# Patient Record
Sex: FEMALE | Race: WHITE | NOT HISPANIC OR LATINO | Employment: PART TIME | URBAN - METROPOLITAN AREA
[De-identification: names, ages, dates, MRNs, and addresses within clinical notes are randomized per-mention and may not be internally consistent; named-entity substitution may affect disease eponyms.]

---
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
## 2022-01-30 ENCOUNTER — OFFICE VISIT (OUTPATIENT)
Dept: URGENT CARE | Facility: CLINIC | Age: 22
End: 2022-01-30
Payer: MEDICARE

## 2022-01-30 VITALS
SYSTOLIC BLOOD PRESSURE: 134 MMHG | HEART RATE: 108 BPM | HEIGHT: 65 IN | RESPIRATION RATE: 16 BRPM | TEMPERATURE: 97.4 F | BODY MASS INDEX: 18.33 KG/M2 | WEIGHT: 110 LBS | DIASTOLIC BLOOD PRESSURE: 88 MMHG | OXYGEN SATURATION: 99 %

## 2022-01-30 DIAGNOSIS — K08.89 PAIN, DENTAL: Primary | ICD-10-CM

## 2022-01-30 DIAGNOSIS — K08.9 POOR DENTITION: ICD-10-CM

## 2022-01-30 PROBLEM — S39.012A STRAIN OF LUMBAR REGION: Status: ACTIVE | Noted: 2021-09-17

## 2022-01-30 PROCEDURE — 99213 OFFICE O/P EST LOW 20 MIN: CPT | Performed by: FAMILY MEDICINE

## 2022-01-30 RX ORDER — CLINDAMYCIN HYDROCHLORIDE 300 MG/1
300 CAPSULE ORAL EVERY 8 HOURS
Qty: 21 CAPSULE | Refills: 0 | Status: SHIPPED | OUTPATIENT
Start: 2022-01-30 | End: 2022-02-06

## 2022-01-30 NOTE — PATIENT INSTRUCTIONS
1  Dental pain  - allergy to Penicillin   - Clindamycin prescribed, to be completed as directed  - take Tylenol or Motrin as needed  - apply ice to the site  - may use topical Orajel solution as needed  - patient is to follow up with a dentist as soon as possible  - if symptoms persist despite treatment, worsen, or any new symptoms present, patient is to be seen in the ER

## 2022-01-30 NOTE — PROGRESS NOTES
[facility]        NAME: Terri Alvarez is a 25 y o  female  : 2000    MRN: [id number]  DATE: 2022  TIME: 3:32 PM    Assessment and Plan   Pain, dental [K08 89]  1  Pain, dental  clindamycin (CLEOCIN) 300 MG capsule   2  Poor dentition           Patient Instructions     Patient Instructions   1  Dental pain  - allergy to Penicillin   - Clindamycin prescribed, to be completed as directed  - take Tylenol or Motrin as needed  - apply ice to the site  - may use topical Orajel solution as needed  - patient is to follow up with a dentist as soon as possible  - if symptoms persist despite treatment, worsen, or any new symptoms present, patient is to be seen in the ER  Follow up with PCP in 3-5 days  Proceed to  ER if symptoms worsen  Chief Complaint     Chief Complaint   Patient presents with    Dental Pain     upper front x 1 week, taking tylenol         History of Present Illness       26 yo female presents for dental pain  Patient states her right front tooth is aching and tender to touch  She states the gums above the tooth look a little swollen  She denies any injury to tooth  No bleeding or pus like discharge from the tooth  No foul taste in the mouth  No fever/chills  No facial pain or swelling  Patient denies any chance of pregnancy  She has a known allergy to Penicillins  She has taken Clindamycin in the past for dental issues  She states she has been taking Tylenol and Nyquil as needed for the symptoms  She states she is currently in the process of establishing care with a dental office that accepts her insurance  Review of Systems   Review of Systems   Constitutional: Negative  HENT: Positive for dental problem  As noted in HPI   Eyes: Negative  Respiratory: Negative  Cardiovascular: Negative  Allergic/Immunologic:        Penicillin   Neurological: Negative  Hematological: Negative            Current Medications       Current Outpatient Medications:     clindamycin (CLEOCIN) 300 MG capsule, Take 1 capsule (300 mg total) by mouth every 8 (eight) hours for 7 days, Disp: 21 capsule, Rfl: 0    ibuprofen (MOTRIN) 600 mg tablet, Take 1 tablet (600 mg total) by mouth every 6 (six) hours as needed for mild pain, Disp: 30 tablet, Rfl: 0    Current Allergies     Allergies as of 01/30/2022 - Reviewed 01/30/2022   Allergen Reaction Noted    Amoxicillin Vomiting 01/30/2022            The following portions of the patient's history were reviewed and updated as appropriate: allergies, current medications, past family history, past medical history, past social history, past surgical history and problem list      Past Medical History:   Diagnosis Date    Migraine     Urinary tract infection     at age 12 or 8       History reviewed  No pertinent surgical history  Family History   Problem Relation Age of Onset   [employer] [place] Cancer Mother     No Known Problems Father     Heart disease Maternal Grandmother     Diabetes Maternal Grandmother     Cancer Maternal Grandfather          Medications have been verified  Objective   /88   Pulse (!) 108   Temp (!) 97 4 °F (36 3 °C)   Resp 16   Ht 5' 5" (1 651 m)   Wt 49 9 kg (110 lb)   SpO2 99%   BMI 18 30 kg/m²   No LMP recorded  Physical Exam     Physical Exam  Vitals and nursing note reviewed  Constitutional:       General: She is awake  She is not in acute distress  Appearance: Normal appearance  She is well-developed and well-groomed  She is not ill-appearing, toxic-appearing or diaphoretic  HENT:      Head: Normocephalic and atraumatic  Mouth/Throat:      Lips: Pink  Mouth: Mucous membranes are moist       Dentition: Abnormal dentition  Dental tenderness, gingival swelling and dental caries present  No dental abscesses  Comments: Patient has poor dentition with multiple broken and decayed teeth     There is mild gingival swelling and tenderness to palpation of the gum above the right front tooth  No palpable area consistent with an abscess  No bleeding or drainage from the site  Neurological:      Mental Status: She is alert  Psychiatric:         Behavior: Behavior is cooperative

## 2022-01-31 ENCOUNTER — TELEPHONE (OUTPATIENT)
Dept: URGENT CARE | Facility: CLINIC | Age: 22
End: 2022-01-31

## 2022-01-31 NOTE — TELEPHONE ENCOUNTER
PT CALLED REQUESTED MEDICATION WAS TO BE IN LIQUID FORM AS SHE CANT SWALLOW PILLS AND SHE STATED THIS WAS DISCUSSED DURING APPT  PLEASE RESEND TO PHARMACY

## 2022-03-30 NOTE — TELEPHONE ENCOUNTER
03/29/22 10:42 PM     Thank you for your request  Your request has been received, reviewed, and the patient chart updated  The PCP has successfully been removed with a patient attribution note  Please remind staff to not remove PCP at office level for this scenario; [facility] Department will remove  This message will now be completed      Thank you  Raul Alexis

## 2022-04-03 ENCOUNTER — OFFICE VISIT (OUTPATIENT)
Dept: URGENT CARE | Age: 22
End: 2022-04-03
Payer: MEDICARE

## 2022-04-03 VITALS
SYSTOLIC BLOOD PRESSURE: 110 MMHG | OXYGEN SATURATION: 98 % | DIASTOLIC BLOOD PRESSURE: 65 MMHG | RESPIRATION RATE: 18 BRPM | TEMPERATURE: 98.2 F | HEART RATE: 110 BPM

## 2022-04-03 DIAGNOSIS — K08.89 PAIN, DENTAL: Primary | ICD-10-CM

## 2022-04-03 PROCEDURE — 99213 OFFICE O/P EST LOW 20 MIN: CPT | Performed by: STUDENT IN AN ORGANIZED HEALTH CARE EDUCATION/TRAINING PROGRAM

## 2022-04-03 RX ORDER — LIDOCAINE HYDROCHLORIDE 20 MG/ML
10 SOLUTION OROPHARYNGEAL 4 TIMES DAILY PRN
Qty: 100 ML | Refills: 1 | Status: SHIPPED | OUTPATIENT
Start: 2022-04-03

## 2022-04-03 RX ORDER — CLINDAMYCIN PALMITATE HYDROCHLORIDE 75 MG/5ML
300 SOLUTION ORAL 3 TIMES DAILY
Qty: 420 ML | Refills: 0 | Status: SHIPPED | OUTPATIENT
Start: 2022-04-03 | End: 2022-04-10

## 2022-04-03 RX ORDER — CLINDAMYCIN HYDROCHLORIDE 300 MG/1
300 CAPSULE ORAL 3 TIMES DAILY
Qty: 21 CAPSULE | Refills: 0 | Status: SHIPPED | OUTPATIENT
Start: 2022-04-03 | End: 2022-04-03

## 2022-04-03 NOTE — PROGRESS NOTES
[facility]        NAME: Kristin Ulloa is a 25 y o  female  : 2000    MRN: [id number]  DATE: April 3, 2022  TIME: 6:44 PM    Assessment and Plan   Pain, dental [K08 89]  1  Pain, dental  clindamycin (CLEOCIN) 75 mg/5 mL solution    Lidocaine Viscous HCl (XYLOCAINE) 2 % mucosal solution    DISCONTINUED: clindamycin (CLEOCIN) 300 MG capsule     Follow up dentist tuesday    Patient Instructions       Follow up with PCP in 3-5 days  Proceed to  ER if symptoms worsen  Chief Complaint     Chief Complaint   Patient presents with    Dental Pain     , appt  with dentist 2022 per pt  History of Present Illness       HPI   Patient is unable to get appointment with her dentist, presents today with dental pain  She thinks she has infection  Patient had this type of infection b4    Patient does not have any fevers or chills, no difficulty swallowing but has developed some pain while chewing    Review of Systems   Review of Systems  Per hpi     Current Medications       Current Outpatient Medications:     clindamycin (CLEOCIN) 75 mg/5 mL solution, Take 20 mL (300 mg total) by mouth 3 (three) times a day for 7 days, Disp: 420 mL, Rfl: 0    ibuprofen (MOTRIN) 600 mg tablet, Take 1 tablet (600 mg total) by mouth every 6 (six) hours as needed for mild pain, Disp: 30 tablet, Rfl: 0    Lidocaine Viscous HCl (XYLOCAINE) 2 % mucosal solution, Swish and spit 10 mL 4 (four) times a day as needed for mouth pain or discomfort, Disp: 100 mL, Rfl: 1    Current Allergies     Allergies as of 2022 - Reviewed 2022   Allergen Reaction Noted    Amoxicillin Vomiting 2022            The following portions of the patient's history were reviewed and updated as appropriate: allergies, current medications, past family history, past medical history, past social history, past surgical history and problem list      Past Medical History:   Diagnosis Date    Migraine     Urinary tract infection at age 12 or 8       History reviewed  No pertinent surgical history  Family History   Problem Relation Age of Onset   [employer] Cancer Mother     No Known Problems Father     Heart disease Maternal Grandmother     Diabetes Maternal Grandmother     Cancer Maternal Grandfather          Medications have been verified  Objective   /65   Pulse (!) 110   Temp 98 2 °F (36 8 °C)   Resp 18   LMP 03/20/2022   SpO2 98%   Patient's last menstrual period was 03/20/2022  Physical Exam     Physical Exam  Constitutional:       General: She is not in acute distress  Appearance: Normal appearance  HENT:      Head: Normocephalic  Nose: No congestion or rhinorrhea  Mouth/Throat:      Mouth: Mucous membranes are moist       Pharynx: Posterior oropharyngeal erythema present  No oropharyngeal exudate  Comments: suspicioun for dental abscess  Eyes:      General:         Right eye: No discharge  Left eye: No discharge  Conjunctiva/sclera: Conjunctivae normal    Cardiovascular:      Rate and Rhythm: Normal rate and regular rhythm  Pulses: Normal pulses  Pulmonary:      Effort: Pulmonary effort is normal  No respiratory distress  Abdominal:      General: Abdomen is flat  There is no distension  Palpations: Abdomen is soft  Tenderness: There is no abdominal tenderness  Musculoskeletal:      Cervical back: Neck supple  Skin:     General: Skin is warm  Capillary Refill: Capillary refill takes less than 2 seconds  Neurological:      Mental Status: She is alert and oriented to person, place, and time

## 2022-07-19 ENCOUNTER — OFFICE VISIT (OUTPATIENT)
Dept: URGENT CARE | Age: 22
End: 2022-07-19
Payer: MEDICARE

## 2022-07-19 DIAGNOSIS — K04.7 DENTAL ABSCESS: Primary | ICD-10-CM

## 2022-07-19 PROCEDURE — 99213 OFFICE O/P EST LOW 20 MIN: CPT | Performed by: STUDENT IN AN ORGANIZED HEALTH CARE EDUCATION/TRAINING PROGRAM

## 2022-07-19 RX ORDER — CLINDAMYCIN PALMITATE HYDROCHLORIDE 75 MG/5ML
300 SOLUTION ORAL 3 TIMES DAILY
Qty: 420 ML | Refills: 0 | Status: SHIPPED | OUTPATIENT
Start: 2022-07-19 | End: 2022-07-26

## 2022-07-19 NOTE — PROGRESS NOTES
[facility]        NAME: Anastasia Zafar is a 25 y o  female  : 2000    MRN: [id number]  DATE: 2022  TIME: 6:28 PM    Assessment and Plan   Dental abscess [K04 7]  1  Dental abscess  clindamycin (CLEOCIN) 75 mg/5 mL solution         Patient Instructions       Follow up with PCP in 3-5 days  Proceed to  ER if symptoms worsen  Chief Complaint   No chief complaint on file  History of Present Illness       HPI   Patient has multiple dental issues, has not been able to find a oral surgeon in the area, she has multiple recurrent dental infections, states she has been having lot of pain in her upper incisor area on the left side denies any fevers or chills  She states she has pain when she is chewing  This all started about a few days ago    Review of Systems   Review of Systems  Per hpi     Current Medications       Current Outpatient Medications:     clindamycin (CLEOCIN) 75 mg/5 mL solution, Take 20 mL (300 mg total) by mouth 3 (three) times a day for 7 days, Disp: 420 mL, Rfl: 0    ibuprofen (MOTRIN) 600 mg tablet, Take 1 tablet (600 mg total) by mouth every 6 (six) hours as needed for mild pain, Disp: 30 tablet, Rfl: 0    Lidocaine Viscous HCl (XYLOCAINE) 2 % mucosal solution, Swish and spit 10 mL 4 (four) times a day as needed for mouth pain or discomfort, Disp: 100 mL, Rfl: 1    Current Allergies     Allergies as of 2022 - Reviewed 2022   Allergen Reaction Noted    Amoxicillin Vomiting 2022            The following portions of the patient's history were reviewed and updated as appropriate: allergies, current medications, past family history, past medical history, past social history, past surgical history and problem list      Past Medical History:   Diagnosis Date    Migraine     Urinary tract infection     at age 12 or 10       No past surgical history on file      Family History   Problem Relation Age of Onset    Cancer Mother     No Known Problems Father     Heart disease Maternal Grandmother     Diabetes Maternal Grandmother     Cancer Maternal Grandfather          Medications have been verified  Objective   There were no vitals taken for this visit  No LMP recorded  Physical Exam     Physical Exam  Constitutional:       General: She is not in acute distress  Appearance: Normal appearance  HENT:      Head: Normocephalic  Nose: No congestion or rhinorrhea  Mouth/Throat:      Mouth: Mucous membranes are moist       Pharynx: Posterior oropharyngeal erythema present  No oropharyngeal exudate  Comments: Poor dentition, erythema and tenderness above the left upper incisor  Eyes:      General:         Right eye: No discharge  Left eye: No discharge  Conjunctiva/sclera: Conjunctivae normal    Cardiovascular:      Rate and Rhythm: Normal rate and regular rhythm  Pulses: Normal pulses  Pulmonary:      Effort: Pulmonary effort is normal  No respiratory distress  Abdominal:      General: Abdomen is flat  There is no distension  Palpations: Abdomen is soft  Tenderness: There is no abdominal tenderness  Musculoskeletal:      Cervical back: Neck supple  Skin:     General: Skin is warm  Capillary Refill: Capillary refill takes less than 2 seconds  Neurological:      Mental Status: She is alert and oriented to person, place, and time
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
## 2023-02-24 ENCOUNTER — OFFICE VISIT (OUTPATIENT)
Dept: URGENT CARE | Age: 23
End: 2023-02-24

## 2023-02-24 VITALS
OXYGEN SATURATION: 99 % | SYSTOLIC BLOOD PRESSURE: 122 MMHG | WEIGHT: 117 LBS | TEMPERATURE: 97.8 F | DIASTOLIC BLOOD PRESSURE: 64 MMHG | BODY MASS INDEX: 19.47 KG/M2 | RESPIRATION RATE: 20 BRPM | HEART RATE: 84 BPM

## 2023-02-24 DIAGNOSIS — K04.7 DENTAL ABSCESS: Primary | ICD-10-CM

## 2023-02-24 RX ORDER — CLINDAMYCIN PALMITATE HYDROCHLORIDE 75 MG/5ML
300 SOLUTION ORAL 3 TIMES DAILY
Qty: 420 ML | Refills: 0 | Status: SHIPPED | OUTPATIENT
Start: 2023-02-24 | End: 2023-03-03

## 2023-02-24 NOTE — PATIENT INSTRUCTIONS
Begin antibiotics 3 times daily x7 days  If you notice worsening facial swelling, fever, worsening pain, present to emergency department as IV antibiotics/imaging may be required  Secure dental appointment as soon as possible  May alternate Tylenol 650 mg every 4-6 hours with ibuprofen 600 mg every 6-8 hours as needed for pain  Warm compresses may also help pain

## 2023-02-24 NOTE — PROGRESS NOTES
[facility]        NAME: Bry Monahan is a 23 y o  female  : 2000    MRN: [id number]  DATE: 2023  TIME: 3:43 PM    Assessment and Plan   Dental abscess [K04 7]  1  Dental abscess  clindamycin (CLEOCIN) 75 mg/5 mL solution      Begin antibiotics 3 times daily x7 days  If you notice worsening facial swelling, fever, worsening pain, present to emergency department as IV antibiotics/imaging may be required  Secure dental appointment as soon as possible  May alternate Tylenol 650 mg every 4-6 hours with ibuprofen 600 mg every 6-8 hours as needed for pain  Warm compresses may also help pain  Patient Instructions   Dental Abscess   WHAT YOU NEED TO KNOW:   A dental abscess is a collection of pus in or around a tooth  A dental abscess is caused by bacteria  The bacteria can enter the tooth when the enamel (outer part of the tooth) is damaged by tooth decay  Bacteria can also enter the tooth through a chip in the tooth or a cut in the gum  Food particles that are stuck between the teeth for a long time may also lead to an abscess          DISCHARGE INSTRUCTIONS:   Return to the emergency department if:   • You have severe pain in your tooth or jaw      • You have trouble breathing because of pain or swelling      Call your doctor if:   • Your symptoms get worse, even after treatment      • Your mouth is bleeding      • You cannot eat or drink because of pain or swelling      • Your abscess returns      • You have an injury that causes a crack in your tooth      • You have questions or concerns about your condition or care      Medicines: You may  need any of the following:  • Antibiotics  help treat a bacterial infection       • NSAIDs , such as ibuprofen, help decrease swelling, pain, and fever  This medicine is available with or without a doctor's order  NSAIDs can cause stomach bleeding or kidney problems in certain people   If you take blood thinner medicine, always ask your healthcare provider if NSAIDs are safe for you  Always read the medicine label and follow directions      • Acetaminophen  decreases pain and fever  It is available without a doctor's order  Ask how much to take and how often to take it  Follow directions  Read the labels of all other medicines you are using to see if they also contain acetaminophen, or ask your doctor or pharmacist  Acetaminophen can cause liver damage if not taken correctly      • Prescription pain medicine  may be given  Ask your healthcare provider how to take this medicine safely  Some prescription pain medicines contain acetaminophen  Do not take other medicines that contain acetaminophen without talking to your healthcare provider  Too much acetaminophen may cause liver damage  Prescription pain medicine may cause constipation  Ask your healthcare provider how to prevent or treat constipation       • Take your medicine as directed  Contact your healthcare provider if you think your medicine is not helping or if you have side effects  Tell your provider if you are allergic to any medicine  Keep a list of the medicines, vitamins, and herbs you take  Include the amounts, and when and why you take them  Bring the list or the pill bottles to follow-up visits  Carry your medicine list with you in case of an emergency      Self-care:   • Rinse your mouth every 2 hours with salt water  This will help keep the area clean       • Gently brush your teeth twice a day with a soft tooth brush  This will help keep the area clean       • Eat soft foods as directed  Soft foods may cause less pain  Examples include applesauce, yogurt, and cooked pasta  Ask your healthcare provider how long to follow this instruction       • Apply a warm compress to your tooth or gum  Use a cotton ball or gauze soaked in warm water  Remove the compress in 10 minutes or when it becomes cool   Repeat 3 times a day      Prevent another abscess:   • Brush your teeth at least 2 times a day  with fluoride toothpaste      • Use dental floss at least once a day  to clean between your teeth      • Rinse your mouth with water or mouthwash  after meals and snacks  Chew sugarless gum      • Avoid sugary and starchy food that can stick between your teeth  Limit drinks high in sugar, such as soda or fruit juice      • See your dentist every 6 months  for dental cleanings and oral exams      Follow up with your doctor or dentist in 24 hours, or as directed: Your healthcare provider will need to check your teeth and gums  Write down your questions so you remember to ask them during your visits  © Copyright [employer] 2022 Information is for End User's use only and may not be sold, redistributed or otherwise used for commercial purposes  The above information is an  only  It is not intended as medical advice for individual conditions or treatments  Talk to your doctor, nurse or pharmacist before following any medical regimen to see if it is safe and effective for you  Follow up with PCP in 3-5 days  Proceed to  ER if symptoms worsen  Chief Complaint     Chief Complaint   Patient presents with   • Facial Pain     Facial abscess on left side it's been 3 days   History of Present Illness       Patient is a 51-year-old female with past medical history significant for dental abscess, who presents for evaluation of left maxillary dental pain, as well as left facial pain and mild swelling  She first noticed the symptoms approximately 3 days ago  She has been placed on clindamycin in the past for dental infection  She reports that she is in the process of securing a dentist however due to insurance issues she has yet to be seen  She denies fever, eye pain, dental drainage, neck pain or stiffness, headache  Review of Systems   Review of Systems   Constitutional: Negative for chills, fatigue and fever  HENT: Positive for dental problem and facial swelling   Negative for congestion, ear discharge, ear pain, postnasal drip, rhinorrhea, sinus pressure, sinus pain, sneezing and sore throat  Facial pain   Eyes: Negative  Negative for pain, discharge, redness, itching and visual disturbance  Respiratory: Negative  Negative for apnea, cough, choking, chest tightness, shortness of breath, wheezing and stridor  Cardiovascular: Negative  Negative for chest pain and palpitations  Gastrointestinal: Negative  Negative for abdominal pain, diarrhea, nausea and vomiting  Endocrine: Negative  Negative for polydipsia, polyphagia and polyuria  Genitourinary: Negative  Negative for decreased urine volume, dysuria, flank pain and hematuria  Musculoskeletal: Negative  Negative for arthralgias, back pain, myalgias, neck pain and neck stiffness  Skin: Negative  Negative for color change and rash  Allergic/Immunologic: Negative  Negative for environmental allergies  Neurological: Negative  Negative for dizziness, seizures, syncope, facial asymmetry, light-headedness, numbness and headaches  Hematological: Negative  Negative for adenopathy  Psychiatric/Behavioral: Negative  All other systems reviewed and are negative          Current Medications       Current Outpatient Medications:   •  clindamycin (CLEOCIN) 75 mg/5 mL solution, Take 20 mL (300 mg total) by mouth 3 (three) times a day for 7 days, Disp: 420 mL, Rfl: 0  •  ibuprofen (MOTRIN) 600 mg tablet, Take 1 tablet (600 mg total) by mouth every 6 (six) hours as needed for mild pain, Disp: 30 tablet, Rfl: 0  •  Lidocaine Viscous HCl (XYLOCAINE) 2 % mucosal solution, Swish and spit 10 mL 4 (four) times a day as needed for mouth pain or discomfort, Disp: 100 mL, Rfl: 1    Current Allergies     Allergies as of 02/24/2023 - Reviewed 02/24/2023   Allergen Reaction Noted   • Amoxicillin Vomiting 01/30/2022            The following portions of the patient's history were reviewed and updated as appropriate: allergies, current medications, past family history, past medical history, past social history, past surgical history and problem list      Past Medical History:   Diagnosis Date   • Migraine    • Urinary tract infection     at age 12 or 8       History reviewed  No pertinent surgical history  Family History   Problem Relation Age of Onset   • Cancer Mother    • No Known Problems Father    • Heart disease Maternal Grandmother    • Diabetes Maternal Grandmother    • Cancer Maternal Grandfather          Medications have been verified  Objective   /64   Pulse 84   Temp 97 8 °F (36 6 °C) (Temporal)   Resp 20   Wt 53 1 kg (117 lb)   LMP 02/10/2023 (Approximate)   SpO2 99%   BMI 19 47 kg/m²        Physical Exam     Physical Exam  Vitals and nursing note reviewed  Constitutional:       General: She is not in acute distress  Appearance: Normal appearance  She is not ill-appearing, toxic-appearing or diaphoretic  Interventions: She is not intubated  HENT:      Head: Normocephalic and atraumatic  Jaw: There is normal jaw occlusion  No trismus, tenderness, swelling, pain on movement or malocclusion  Salivary Glands: Left salivary gland is not diffusely enlarged or tender  Comments: Left maxillary tenderness/mild swelling  Right Ear: External ear normal       Left Ear: External ear normal       Nose: Nose normal  No congestion or rhinorrhea  Mouth/Throat:      Lips: Pink  No lesions  Mouth: Mucous membranes are moist       Dentition: Abnormal dentition  Does not have dentures  Dental tenderness, gingival swelling and dental caries present  No dental abscesses or gum lesions  Tongue: No lesions  Tongue does not deviate from midline  Palate: No mass and lesions  Pharynx: Oropharynx is clear  Uvula midline  No pharyngeal swelling, oropharyngeal exudate, posterior oropharyngeal erythema or uvula swelling        Tonsils: No tonsillar exudate or tonsillar abscesses  1+ on the right  1+ on the left  Comments: Dental tenderness above left maxillary incisor  Eyes:      Extraocular Movements: Extraocular movements intact  Conjunctiva/sclera: Conjunctivae normal       Pupils: Pupils are equal, round, and reactive to light  Cardiovascular:      Rate and Rhythm: Normal rate and regular rhythm  Pulses: Normal pulses  Heart sounds: Normal heart sounds, S1 normal and S2 normal  Heart sounds not distant  No murmur heard  No friction rub  No gallop  Pulmonary:      Effort: Pulmonary effort is normal  No tachypnea, bradypnea, accessory muscle usage, prolonged expiration, respiratory distress or retractions  She is not intubated  Breath sounds: Normal breath sounds  No stridor, decreased air movement or transmitted upper airway sounds  No decreased breath sounds, wheezing, rhonchi or rales  Chest:      Chest wall: No tenderness  Abdominal:      General: Bowel sounds are normal       Palpations: Abdomen is soft  Tenderness: There is no abdominal tenderness  There is no guarding or rebound  Musculoskeletal:         General: Normal range of motion  Cervical back: Normal range of motion and neck supple  No rigidity or tenderness  Lymphadenopathy:      Cervical: No cervical adenopathy  Skin:     General: Skin is warm and dry  Capillary Refill: Capillary refill takes less than 2 seconds  Neurological:      General: No focal deficit present  Mental Status: She is alert and oriented to person, place, and time  Cranial Nerves: No cranial nerve deficit     Psychiatric:         Mood and Affect: Mood normal          Behavior: Behavior normal

## 2023-02-24 NOTE — LETTER
February 24, 2023     Patient: Salvador Lund   YOB: 2000   Date of Visit: 2/24/2023       To Whom it May Concern:    Neeru Mederos was seen in my clinic on 2/24/2023  She may return on 2/27/2023  If you have any questions or concerns, please don't hesitate to call           Sincerely,          SRIKANTH Vela        CC: No Recipients

## 2023-02-25 ENCOUNTER — HOSPITAL ENCOUNTER (EMERGENCY)
Facility: HOSPITAL | Age: 23
Discharge: HOME | End: 2023-02-25
Attending: EMERGENCY MEDICINE | Admitting: EMERGENCY MEDICINE

## 2023-02-25 VITALS
SYSTOLIC BLOOD PRESSURE: 112 MMHG | DIASTOLIC BLOOD PRESSURE: 65 MMHG | BODY MASS INDEX: 19.49 KG/M2 | OXYGEN SATURATION: 97 % | HEART RATE: 100 BPM | RESPIRATION RATE: 18 BRPM | WEIGHT: 117 LBS | HEIGHT: 65 IN | TEMPERATURE: 98.3 F

## 2023-02-25 DIAGNOSIS — K04.7 DENTAL ABSCESS: ICD-10-CM

## 2023-02-25 DIAGNOSIS — K02.9 DENTAL CARIES: Primary | ICD-10-CM

## 2023-02-25 RX ORDER — CHLORHEXIDINE GLUCONATE 0.12 MG/ML
15 RINSE ORAL 2 TIMES DAILY
Qty: 120 ML | Refills: 0 | Status: SHIPPED | OUTPATIENT
Start: 2023-02-25

## 2023-02-25 NOTE — ED PROVIDER NOTES
History  Chief Complaint   Patient presents with   • Eye Swelling     Abcess above gums and now is causing swelling in left eye     Patient is a 23year old female with history of dental abscess who presents to the emergency department for dental pain  She states that she has had chronic poor dentition with dental abscesses in the past   On Tuesday she started developing pain and swelling  She went to urgent care yesterday and was started on clindamycin, she took 1 dose last night and 1 dose this morning but noticed increased swelling this morning so she came to the ER for further evaluation  Patient denies any fevers, chills, eye pain, ear pain  She denies having any pain injury or foul taste in her mouth  Patient has been using Tylenol for pain  She has not been able to see a dentist yet  Prior to Admission Medications   Prescriptions Last Dose Informant Patient Reported? Taking? Lidocaine Viscous HCl (XYLOCAINE) 2 % mucosal solution   No No   Sig: Swish and spit 10 mL 4 (four) times a day as needed for mouth pain or discomfort   clindamycin (CLEOCIN) 75 mg/5 mL solution   No No   Sig: Take 20 mL (300 mg total) by mouth 3 (three) times a day for 7 days   ibuprofen (MOTRIN) 600 mg tablet   No No   Sig: Take 1 tablet (600 mg total) by mouth every 6 (six) hours as needed for mild pain      Facility-Administered Medications: None       Past Medical History:   Diagnosis Date   • Migraine    • Urinary tract infection     at age 12 or 10       History reviewed  No pertinent surgical history  Family History   Problem Relation Age of Onset   • Cancer Mother    • No Known Problems Father    • Heart disease Maternal Grandmother    • Diabetes Maternal Grandmother    • Cancer Maternal Grandfather      I have reviewed and agree with the history as documented      E-Cigarette/Vaping   • E-Cigarette Use Current Every Day User      E-Cigarette/Vaping Substances   • THC Yes      Social History     Tobacco Use   • Smoking status: Never   • Smokeless tobacco: Never   Vaping Use   • Vaping Use: Every day   • Substances: THC   Substance Use Topics   • Alcohol use: No   • Drug use: No        Review of Systems   Constitutional: Negative for chills and fever  HENT: Positive for dental problem  Negative for congestion  Eyes: Negative for redness  Respiratory: Negative for cough and shortness of breath  Cardiovascular: Negative for chest pain and palpitations  Gastrointestinal: Negative for abdominal pain, diarrhea and vomiting  Endocrine: Negative for polyuria  Genitourinary: Negative for dysuria and hematuria  Musculoskeletal: Negative for arthralgias and back pain  Skin: Negative for rash  Neurological: Negative for light-headedness and headaches  All other systems reviewed and are negative  Physical Exam  ED Triage Vitals [02/25/23 1321]   Temperature Pulse Respirations Blood Pressure SpO2   98 3 °F (36 8 °C) 100 18 112/65 97 %      Temp Source Heart Rate Source Patient Position - Orthostatic VS BP Location FiO2 (%)   Oral Monitor Sitting Left arm --      Pain Score       --             Orthostatic Vital Signs  Vitals:    02/25/23 1321   BP: 112/65   Pulse: 100   Patient Position - Orthostatic VS: Sitting       Physical Exam  Vitals and nursing note reviewed  Constitutional:       General: She is not in acute distress  Appearance: She is not ill-appearing  HENT:      Head: Normocephalic and atraumatic  Mouth/Throat:      Mouth: Mucous membranes are moist       Dentition: Abnormal dentition  Dental tenderness, gingival swelling and dental caries present  Tongue: No lesions  Palate: No mass and lesions  Pharynx: Oropharynx is clear  Uvula midline  No pharyngeal swelling, oropharyngeal exudate, posterior oropharyngeal erythema or uvula swelling  Tonsils: No tonsillar exudate or tonsillar abscesses        Comments: Poor dentition diffusely, tenderness to palpation over left superior teeth diffusely, no focal area of abscess able to be drained  No bleeding  Eyes:      Conjunctiva/sclera: Conjunctivae normal    Cardiovascular:      Rate and Rhythm: Normal rate  Pulmonary:      Effort: Pulmonary effort is normal  No respiratory distress  Abdominal:      General: There is no distension  Musculoskeletal:         General: Normal range of motion  Cervical back: Neck supple  Skin:     General: Skin is warm  Neurological:      General: No focal deficit present  Mental Status: She is alert  Psychiatric:         Mood and Affect: Mood normal          ED Medications  Medications - No data to display    Diagnostic Studies  Results Reviewed     None                 No orders to display         Procedures  Procedures      ED Course                                       Medical Decision Making  Patient is a 72-year-old female with PMH of dentition who presents to the ED with dental pain  Vital signs labile, afebrile  On exam diffuse dental caries, tenderness to palpation over left superior teeth diffusely, no focal area of abscess able to be drained  No gingival bleeding  [facility] History and exam most consistent with dental abscess, dentalgia  No concern on physical examination for other spread of infection requiring imaging of the face  Plan continue clindamycin as prescribed by urgent care, will prescribe chlorhexidine in addition  View ED course above for further discussion on patient workup  On review of previous records patient was seen in urgent care and started on clindamycin 3 times a day for 7 days  Upon re-evaluation patient resting comfortably in the room, discussed possibility for dental block for pain control in the future, patient does not want at this time  I have reviewed the patient's vital signs, nursing notes, and other relevant tests/information   I had a detailed discussion with the patient regarding the history, exam findings, and any diagnostic results  Plan to discharge home in stable condition with chlorhexidine mouthwash follow up with dental clinic  Discussed with patient who is agreeable to plan  I discussed discharge instructions, need for follow-up, and oral return precautions for what to return for in addition to the written return precautions and discharge instructions, specifically highlighting areas of special concern  The patient verbalized understanding of the discharge instructions and warnings that would necessitate return to the Emergency Department including worsening pain, swelling, fevers  All questions the patient had were answered prior to discharge  Dental abscess: acute illness or injury  Dental caries: acute illness or injury  Risk  Prescription drug management  Disposition  Final diagnoses:   Dental caries   Dental abscess     Time reflects when diagnosis was documented in both MDM as applicable and the Disposition within this note     Time User Action Codes Description Comment    2/25/2023  1:36 PM Madhuri Chen Add [K02 9] Dental caries     2/25/2023  1:36 PM Madhuri Chen Add [K04 7] Dental abscess       ED Disposition     ED Disposition   Discharge    Condition   Stable    Date/Time   Sat Feb 25, 2023  1:36 PM    Karo Guadalupe discharge to home/self care                 Follow-up Information     Follow up With Specialties Details Why Contact Info Additional [address] Dentistry Call in 1 day  [facility] [id number]  [address] [place] [address], [address] [place] [address], [id number], [phone]          Patient's Medications   Discharge Prescriptions    CHLORHEXIDINE (PERIDEX) 0 12 % SOLUTION    Apply 15 mL to the mouth or throat 2 (two) times a day       Start Date: 2/25/2023 End Date: --       Order Dose: 15 mL       Quantity: 120 mL    Refills: 0     No discharge procedures on file     PDMP Review     None           ED Provider  Attending physically available and evaluated Gavin Barclay I managed the patient along with the ED Attending      Electronically Signed by         Wong Bradley DO  02/25/23 1729

## 2023-02-25 NOTE — ED ATTENDING ATTESTATION
2/25/2023  Curt PICKENS DO, saw and evaluated the patient  I have discussed the patient with the resident/non-physician practitioner and agree with the resident's/non-physician practitioner's findings, Plan of Care, and MDM as documented in the resident's/non-physician practitioner's note, except where noted  All available labs and Radiology studies were reviewed  I was present for key portions of any procedure(s) performed by the resident/non-physician practitioner and I was immediately available to provide assistance  At this point I agree with the current assessment done in the Emergency Department  I have conducted an independent evaluation of this patient a history and physical is as follows:    23 y o  female presents for dental pain  Started a few days ago  Left upper tooth  Started on clindamycin  Has significantly poor dentition  No obvious drainable abscess  No evidence of involvement of the orbit or mandible causing trismus  Exam:  Vitals reviewed  Within normal limits, poor dentition  No abscess fairly extensive number of broken acute decayed teeth no trismus no pharyngeal swelling        Impression: Dental abscess dentalgia dental caries      Differential diagnosis: Doubt Ludewig's angina, facial abscess      Plan: Continue antibiotics chlorhexidine follow-up dental clinic                External records independently reviewed by me    History obtained from patient  Has no social barriers to care                      ED Course         Critical Care Time  Procedures
fair/Pt unreceptive to strangers and irritable when touched by others

## 2023-02-25 NOTE — DISCHARGE INSTRUCTIONS
You were seen in the emergency department today for dental abscess  Follow up with dental clinic, call first thing tomorrow morning  Take clindamycin as prescribed by urgent care, chlorhexidine as prescribed  Return to the emergency department for any new or concerning symptoms including worsening pain, swelling, fevers, chills  Thank you for choosing [facility] for your care today

## 2023-04-26 ENCOUNTER — HOSPITAL ENCOUNTER (EMERGENCY)
Facility: HOSPITAL | Age: 23
Discharge: HOME | End: 2023-04-26
Attending: EMERGENCY MEDICINE

## 2023-04-26 VITALS
RESPIRATION RATE: 18 BRPM | WEIGHT: 116 LBS | SYSTOLIC BLOOD PRESSURE: 136 MMHG | HEIGHT: 65 IN | DIASTOLIC BLOOD PRESSURE: 94 MMHG | HEART RATE: 116 BPM | BODY MASS INDEX: 19.33 KG/M2 | TEMPERATURE: 98.3 F | OXYGEN SATURATION: 99 %

## 2023-04-26 DIAGNOSIS — R13.10 DYSPHAGIA: ICD-10-CM

## 2023-04-26 DIAGNOSIS — R09.89 GLOBUS SENSATION: Primary | ICD-10-CM

## 2023-04-26 RX ORDER — FAMOTIDINE 20 MG/1
20 TABLET, FILM COATED ORAL DAILY
Qty: 30 TABLET | Refills: 0 | Status: SHIPPED | OUTPATIENT
Start: 2023-04-26

## 2023-04-26 RX ORDER — FAMOTIDINE 20 MG/1
20 TABLET, FILM COATED ORAL DAILY
Qty: 30 TABLET | Refills: 0 | Status: SHIPPED | OUTPATIENT
Start: 2023-04-26 | End: 2023-04-26 | Stop reason: SDUPTHER

## 2023-04-26 NOTE — DISCHARGE INSTRUCTIONS
Call and make an appointment with a gastroenterologist for further evaluation  Return to ER if experience any throat swelling, voice changes, or difficulty swallowing

## 2023-04-26 NOTE — ED ATTENDING ATTESTATION
4/26/2023  IArya MD, saw and evaluated the patient  I have discussed the patient with the resident/non-physician practitioner and agree with the resident's/non-physician practitioner's findings, Plan of Care, and MDM as documented in the resident's/non-physician practitioner's note, except where noted  All available labs and Radiology studies were reviewed  I was present for key portions of any procedure(s) performed by the resident/non-physician practitioner and I was immediately available to provide assistance  At this point I agree with the current assessment done in the Emergency Department    I have conducted an independent evaluation of this patient a history and physical is as follows:  Feels like throat is  Getting choked up      no actual pain     No stridor no drooling   No sob  No neck mass no voice changes  No dental pain   Patient is able to drink fluids and eat food  No fever  Exam: Well-appearing voice is normal stridor no drooling  Throat clear uvula midline no sublingual swelling  Neck is supple no adenopathy no masses thyroid is nonenlarged  Lungs clear  Impression: Globus sensation  I suspect there is an underlying component of anxiety  Patient will be given outpatient resources for therapist  Will be given for GI  Will start Pepcid  ED Course         Critical Care Time  Procedures

## 2023-04-26 NOTE — ED PROVIDER NOTES
History  Chief Complaint   Patient presents with    Sore Throat     Reports having trouble swallowing and eating for 3 weeks     82-year-old female with history of anxiety presents with recurring episodes of swallowing difficulty for many years  Current episode started 2 weeks ago and is described as a fear of food getting stuck in her throat and having to consciously swallow  Patient able to tolerate solids and liquids  Denies odynophagia, sensation of throat swelling, chest pain, or neck pain  Denies personal or family history of thyroid disease  Prior to Admission Medications   Prescriptions Last Dose Informant Patient Reported? Taking? Lidocaine Viscous HCl (XYLOCAINE) 2 % mucosal solution   No No   Sig: Swish and spit 10 mL 4 (four) times a day as needed for mouth pain or discomfort   chlorhexidine (PERIDEX) 0 12 % solution   No No   Sig: Apply 15 mL to the mouth or throat 2 (two) times a day   ibuprofen (MOTRIN) 600 mg tablet   No No   Sig: Take 1 tablet (600 mg total) by mouth every 6 (six) hours as needed for mild pain      Facility-Administered Medications: None       Past Medical History:   Diagnosis Date    Migraine     Urinary tract infection     at age 12 or 10       History reviewed  No pertinent surgical history  Family History   Problem Relation Age of Onset   Meenakshi Abt Cancer Mother     No Known Problems Father     Heart disease Maternal Grandmother     Diabetes Maternal Grandmother     Cancer Maternal Grandfather      I have reviewed and agree with the history as documented  E-Cigarette/Vaping    E-Cigarette Use Current Every Day User      E-Cigarette/Vaping Substances    THC Yes      Social History     Tobacco Use    Smoking status: Never    Smokeless tobacco: Never   Vaping Use    Vaping Use: Every day    Substances: THC   Substance Use Topics    Alcohol use: No    Drug use: No        Review of Systems   Constitutional: Negative for chills and fever     HENT: Negative for ear pain and sore throat  Eyes: Negative for pain and visual disturbance  Respiratory: Negative for cough and shortness of breath  Cardiovascular: Negative for chest pain and palpitations  Gastrointestinal: Negative for abdominal pain and vomiting  Genitourinary: Negative for dysuria and hematuria  Musculoskeletal: Negative for arthralgias and back pain  Skin: Negative for color change and rash  Neurological: Negative for seizures and syncope  All other systems reviewed and are negative  Physical Exam  ED Triage Vitals   Temperature Pulse Respirations Blood Pressure SpO2   04/26/23 1621 04/26/23 1621 04/26/23 1621 04/26/23 1621 04/26/23 1621   98 3 °F (36 8 °C) (!) 116 18 136/94 99 %      Temp Source Heart Rate Source Patient Position - Orthostatic VS BP Location FiO2 (%)   04/26/23 1621 04/26/23 1621 04/26/23 1621 04/26/23 1621 --   Oral Monitor Lying Right arm       Pain Score       04/26/23 1623       4             Orthostatic Vital Signs  Vitals:    04/26/23 1621   BP: 136/94   Pulse: (!) 116   Patient Position - Orthostatic VS: Lying       Physical Exam  Vitals and nursing note reviewed  Constitutional:       General: She is not in acute distress  Appearance: Normal appearance  She is not ill-appearing, toxic-appearing or diaphoretic  HENT:      Head: Normocephalic and atraumatic  Right Ear: Tympanic membrane, ear canal and external ear normal       Left Ear: Tympanic membrane, ear canal and external ear normal       Nose: Nose normal       Mouth/Throat:      Mouth: Mucous membranes are moist  No oral lesions  Pharynx: Oropharynx is clear  Uvula midline  No pharyngeal swelling, oropharyngeal exudate, posterior oropharyngeal erythema or uvula swelling  Tonsils: No tonsillar exudate or tonsillar abscesses  1+ on the right  1+ on the left  Eyes:      General:         Right eye: No discharge  Left eye: No discharge        Extraocular Movements: Extraocular movements intact  Conjunctiva/sclera: Conjunctivae normal    Neck:      Thyroid: No thyromegaly  Cardiovascular:      Rate and Rhythm: Normal rate and regular rhythm  Pulses: Normal pulses  Heart sounds: Normal heart sounds  No murmur heard  No friction rub  Pulmonary:      Effort: Pulmonary effort is normal  No respiratory distress  Breath sounds: Normal breath sounds  No wheezing or rales  Abdominal:      General: Abdomen is flat  Bowel sounds are normal  There is no distension  Palpations: Abdomen is soft  Tenderness: There is no abdominal tenderness  There is no guarding  Musculoskeletal:         General: Normal range of motion  Cervical back: Normal range of motion and neck supple  Lymphadenopathy:      Cervical: No cervical adenopathy  Skin:     General: Skin is warm and dry  Capillary Refill: Capillary refill takes less than 2 seconds  Coloration: Skin is not pale  Neurological:      Mental Status: She is alert and oriented to person, place, and time  Psychiatric:         Mood and Affect: Mood normal          ED Medications  Medications - No data to display    Diagnostic Studies  Results Reviewed     None                 No orders to display         Procedures  Procedures      ED Course                                       Medical Decision Making  82-year-old female presents with dysphagia ongoing times many years  No acute findings on exam   Low suspicion for soft tissue infection or acute neurological condition    Plan: Prescription for H2 blocker, referral for GI for possible endoscopy    Dysphagia: chronic illness or injury  Globus sensation: chronic illness or injury        Disposition  Final diagnoses:   Globus sensation   Dysphagia     Time reflects when diagnosis was documented in both MDM as applicable and the Disposition within this note     Time User Action Codes Description Comment    4/26/2023  5:20 PM Chelly Infante Add [R07 19] Globus sensation     4/28/2023  3:35 AM Meka Swan Add [R13 10] Dysphagia       ED Disposition     ED Disposition   Discharge    Condition   Stable    Date/Time   Wed Apr 26, 2023  5:19 PM    Comment   Anabell Guzman discharge to home/self care  Follow-up Information     Follow up With Specialties Details Why Contact Info Additional Information    [facility]   [address] [id number]  Joseph Stewarduim [address] [facility], [address],  [address], [place] [facility], [place], [address]    [facility]   [place]  [phone]             Discharge Medication List as of 4/26/2023  5:54 PM      START taking these medications    Details   famotidine (PEPCID) 20 mg tablet Take 1 tablet (20 mg total) by mouth daily, Starting Wed 4/26/2023, Normal         CONTINUE these medications which have NOT CHANGED    Details   chlorhexidine (PERIDEX) 0 12 % solution Apply 15 mL to the mouth or throat 2 (two) times a day, Starting Sat 2/25/2023, Normal      ibuprofen (MOTRIN) 600 mg tablet Take 1 tablet (600 mg total) by mouth every 6 (six) hours as needed for mild pain, Starting Sat 7/31/2021, Normal      Lidocaine Viscous HCl (XYLOCAINE) 2 % mucosal solution Swish and spit 10 mL 4 (four) times a day as needed for mouth pain or discomfort, Starting Sun 4/3/2022, Normal           No discharge procedures on file  PDMP Review     None           ED Provider  Attending physically available and evaluated Anabell Guzman I managed the patient along with the ED Attending      Electronically Signed by         Aravind Jackson MD  04/28/23 8943

## 2023-05-04 ENCOUNTER — OFFICE VISIT (OUTPATIENT)
Dept: FAMILY MEDICINE CLINIC | Facility: OTHER | Age: 23
End: 2023-05-04

## 2023-05-04 VITALS
BODY MASS INDEX: 17.63 KG/M2 | SYSTOLIC BLOOD PRESSURE: 126 MMHG | OXYGEN SATURATION: 98 % | RESPIRATION RATE: 18 BRPM | DIASTOLIC BLOOD PRESSURE: 70 MMHG | HEART RATE: 91 BPM | WEIGHT: 105.8 LBS | HEIGHT: 65 IN | TEMPERATURE: 98 F

## 2023-05-04 DIAGNOSIS — Z11.59 NEED FOR HEPATITIS C SCREENING TEST: ICD-10-CM

## 2023-05-04 DIAGNOSIS — F41.9 ANXIETY: ICD-10-CM

## 2023-05-04 DIAGNOSIS — Z11.8 SCREENING FOR CHLAMYDIAL DISEASE: ICD-10-CM

## 2023-05-04 DIAGNOSIS — Z11.4 SCREENING FOR HIV (HUMAN IMMUNODEFICIENCY VIRUS): ICD-10-CM

## 2023-05-04 DIAGNOSIS — R13.10 DIFFICULTY SWALLOWING SOLIDS: Primary | ICD-10-CM

## 2023-05-04 DIAGNOSIS — R42 LIGHTHEADEDNESS: ICD-10-CM

## 2023-05-04 PROBLEM — K59.00 CONSTIPATION: Status: ACTIVE | Noted: 2023-05-04

## 2023-05-04 PROBLEM — F41.1 ANXIETY, GENERALIZED: Status: ACTIVE | Noted: 2023-05-04

## 2023-05-04 PROBLEM — K21.9 ACID REFLUX: Status: ACTIVE | Noted: 2023-05-04

## 2023-05-04 NOTE — PATIENT INSTRUCTIONS
Dysphagia   WHAT YOU NEED TO KNOW:   Dysphagia is trouble swallowing  You may have trouble moving food or liquid from your mouth to your esophagus or down to your stomach  You may have the problem when you eat, drink, or any time you try to swallow  Dysphagia can last a short time, or it can be a permanent problem  DISCHARGE INSTRUCTIONS:   Call your local emergency number (911 in the [address] [facility] [address] [facility]) if:   You have chest pain  You have shortness of breath  Return to the emergency department if:   You choke on your saliva  You cannot eat or drink liquids at all  Call your doctor or therapist if:   You lose weight without trying  Your signs and symptoms get worse, or you have new signs or symptoms  You have signs or symptoms of dehydration, such as increased thirst, dark yellow urine, or little or no urine  You get colds often  You have questions or concerns about your condition or care  Nutrition:  You may need to change the texture of the foods you eat to help reduce choking problems  Your healthcare provider may show you how to thicken liquids or soften foods to make them easier to swallow  A therapist  can teach you different ways of swallowing by changing your head and body positions  You may be taught exercises to strengthen the muscles that help you swallow  Follow up with your doctor or therapist as directed:  Write down your questions so you remember to ask them during your visits  © Copyright [employer] 2022 Information is for End User's use only and may not be sold, redistributed or otherwise used for commercial purposes  The above information is an  only  It is not intended as medical advice for individual conditions or treatments  Talk to your doctor, nurse or pharmacist before following any medical regimen to see if it is safe and effective for you

## 2023-05-04 NOTE — PROGRESS NOTES
Name: Erna Price      : 2000      MRN: [id number]  Encounter Provider: Meme Flaherty MD  Encounter Date: 2023   Encounter department: [address] [place] [address] Dr Valencia  Difficulty swallowing solids  Comments:  Eating a strictly pureed diet  Was started on famotidine with minimal improvement  Will trial PPI and observe for improvement  Will need a swallow study done but prefer evaluation by GI to ensure order of the correct testing modality  Orders:  -     Ambulatory Referral to Gastroenterology; Future  -     Ambulatory Referral to Nutrition Services; Future    2  Anxiety  Comments:  Generalized Anxiety   Orders:  -     TSH, 3rd generation with Free T4 reflex; Future    3  Lightheadedness  Comments:  Has been an issue for a few weeks  Pt has lost some weight and has had constipation as well likely all 2/2 to poor PO intake/dehydration  Orders:  -     CBC and differential; Future  -     Comprehensive metabolic panel; Future    4  Body mass index (BMI) less than 19  -     TSH, 3rd generation with Free T4 reflex; Future    5  Need for hepatitis C screening test  -     Hepatitis C antibody; Future    6  Screening for HIV (human immunodeficiency virus)  -     : HIV 1/2 AB/AG w Reflex [facility] for 2 yr old and above; Future    7  Screening for chlamydial disease  -     Chlamydia/GC amplified DNA by PCR; Future     F/U in 1 month for annual physical and to review labs  Subjective      24 y/o F presented with her boyfriend to establish care and follow up after an ED visit  Patient visited the ED on 23 with a complaint of difficulty swallowing solid foods  Difficulty swallowing began roughly two years ago  It was first noticed after a pill became stuck in the throat and took some time to dislodge  Her condition improved, and then got worse  She had a nightmare about not being able to swallow which scared her enough to go to the ED   Pureed foods/liquids can be swallowed, "however they must be finely pureed  She believes it could be caused by her acid reflux which she has tried treating with various OTC antacid medications  Patient reports constipation  She rarely defecates and when she does it is a \"green mucus\" stool  She believes that this is due to lack of eating  Constipation  Associated symptoms include back pain (Chronic LBP )  Pertinent negatives include no abdominal pain, diarrhea, difficulty urinating, fever, nausea or vomiting  Review of Systems   Constitutional: Positive for unexpected weight change (Believed to be caused by not eating, 10 lbs loss )  Negative for chills, fatigue and fever  HENT: Positive for trouble swallowing (Feels like lump in throat ) and voice change (Tightness in throat causes change in voice )  Negative for congestion, sinus pressure, sinus pain and sore throat  Eyes: Negative for pain and visual disturbance  Respiratory: Negative for cough and shortness of breath  Cardiovascular: Negative for chest pain and palpitations  Gastrointestinal: Positive for constipation (Believed to be due to not eating)  Negative for abdominal pain, diarrhea, nausea and vomiting  Endocrine: Positive for cold intolerance  Genitourinary: Negative for difficulty urinating, hematuria and menstrual problem  Musculoskeletal: Positive for back pain (Chronic LBP )  Negative for neck stiffness  Skin: Negative for rash  Allergic/Immunologic: Negative for environmental allergies  Neurological: Positive for dizziness and light-headedness  Negative for speech difficulty and headaches  Psychiatric/Behavioral: Negative for sleep disturbance  The patient is nervous/anxious          Current Outpatient Medications on File Prior to Visit   Medication Sig   • Acetaminophen (TYLENOL DISSOLVE PACKS PO) Take by mouth   • famotidine (PEPCID) 20 mg tablet Take 1 tablet (20 mg total) by mouth daily   • chlorhexidine (PERIDEX) 0 12 % solution Apply 15 mL to " "the mouth or throat 2 (two) times a day (Patient not taking: Reported on 5/4/2023)   • ibuprofen (MOTRIN) 600 mg tablet Take 1 tablet (600 mg total) by mouth every 6 (six) hours as needed for mild pain (Patient not taking: Reported on 5/4/2023)   • Lidocaine Viscous HCl (XYLOCAINE) 2 % mucosal solution Swish and spit 10 mL 4 (four) times a day as needed for mouth pain or discomfort (Patient not taking: Reported on 5/4/2023)       Objective     /70   Pulse 91   Temp 98 °F (36 7 °C)   Resp 18   Ht 5' 5\" (1 651 m)   Wt 48 kg (105 lb 12 8 oz)   LMP 04/12/2023 (Approximate)   SpO2 98%   BMI 17 61 kg/m²     Physical Exam  Constitutional:       Appearance: Normal appearance  HENT:      Right Ear: Tympanic membrane, ear canal and external ear normal       Left Ear: Tympanic membrane, ear canal and external ear normal       Nose: Nose normal       Mouth/Throat:      Mouth: Mucous membranes are moist       Pharynx: Oropharynx is clear  Posterior oropharyngeal erythema (Tonsils) present  Comments: Poor dentition was noted  Eyes:      Pupils: Pupils are equal, round, and reactive to light  Cardiovascular:      Rate and Rhythm: Regular rhythm  Tachycardia present  Pulses: Normal pulses  Heart sounds: Normal heart sounds  Pulmonary:      Breath sounds: Normal breath sounds  Abdominal:      General: Abdomen is flat  Bowel sounds are normal    Skin:     General: Skin is warm  Neurological:      Mental Status: She is alert and oriented to person, place, and time  Psychiatric:         Mood and Affect: Mood normal          Behavior: Behavior normal          Thought Content:  Thought content normal          Judgment: Judgment normal        Faustino Holter, MD  "

## 2023-05-18 ENCOUNTER — TELEPHONE (OUTPATIENT)
Dept: FAMILY MEDICINE CLINIC | Facility: OTHER | Age: 23
End: 2023-05-18

## 2023-05-18 NOTE — TELEPHONE ENCOUNTER
Pt called states she has been taking the omeprazole 20 mg that she had left over  She said she spoke to you during her establish care visit and it was directed to take for 14 days and let PCP know if it helped her acid reflux  She said she has been taking the omeprazole 20 mg 1 tablet daily and it has been helping  She would like to know if she should continue? If so, then shell need a refill until she sees GI (new pt) on 7/31

## 2023-05-19 DIAGNOSIS — R13.10 DIFFICULTY SWALLOWING SOLIDS: ICD-10-CM

## 2023-05-19 DIAGNOSIS — K21.9 GASTROESOPHAGEAL REFLUX DISEASE, UNSPECIFIED WHETHER ESOPHAGITIS PRESENT: Primary | ICD-10-CM

## 2023-05-19 RX ORDER — LANSOPRAZOLE 30 MG/1
30 TABLET, ORALLY DISINTEGRATING, DELAYED RELEASE ORAL DAILY
Qty: 60 TABLET | Refills: 0 | Status: SHIPPED | OUTPATIENT
Start: 2023-05-19 | End: 2023-07-18

## 2023-07-26 ENCOUNTER — OFFICE VISIT (OUTPATIENT)
Dept: URGENT CARE | Age: 23
End: 2023-07-26
Payer: MEDICARE

## 2023-07-26 VITALS
RESPIRATION RATE: 14 BRPM | HEART RATE: 72 BPM | DIASTOLIC BLOOD PRESSURE: 69 MMHG | OXYGEN SATURATION: 100 % | SYSTOLIC BLOOD PRESSURE: 124 MMHG | TEMPERATURE: 98.8 F

## 2023-07-26 DIAGNOSIS — K04.7 DENTAL ABSCESS: Primary | ICD-10-CM

## 2023-07-26 PROCEDURE — 99213 OFFICE O/P EST LOW 20 MIN: CPT

## 2023-07-26 RX ORDER — CLINDAMYCIN PALMITATE HYDROCHLORIDE 75 MG/5ML
150 SOLUTION ORAL 3 TIMES DAILY
Qty: 210 ML | Refills: 0 | Status: SHIPPED | OUTPATIENT
Start: 2023-07-26 | End: 2023-08-02

## 2023-07-26 RX ORDER — OMEPRAZOLE 20 MG/1
20 CAPSULE, DELAYED RELEASE ORAL DAILY
COMMUNITY
End: 2023-07-31 | Stop reason: SDUPTHER

## 2023-07-26 NOTE — PATIENT INSTRUCTIONS
Please begin antibiotics as directed. Follow up with dentist as soon as possible. Present to ED if symptoms worsen.

## 2023-07-26 NOTE — PROGRESS NOTES
[facility]        NAME: Cande Cordova is a 23 y.o. female  : 2000    MRN: [id number]  DATE: 2023  TIME: 2:46 PM    Assessment and Plan   Dental abscess [K04.7]  1. Dental abscess  clindamycin (CLEOCIN) 75 mg/5 mL solution      Please begin antibiotics as directed. Follow up with dentist as soon as possible. Present to ED if symptoms worsen. Patient Instructions     Dental Abscess   WHAT YOU NEED TO KNOW:   A dental abscess is a collection of pus in or around a tooth. A dental abscess is caused by bacteria. The bacteria can enter the tooth when the enamel (outer part of the tooth) is damaged by tooth decay. Bacteria can also enter the tooth through a chip in the tooth or a cut in the gum. Food particles that are stuck between the teeth for a long time may also lead to an abscess.         DISCHARGE INSTRUCTIONS:   Return to the emergency department if:   • You have severe pain in your tooth or jaw.     • You have trouble breathing because of pain or swelling.     Call your doctor if:   • Your symptoms get worse, even after treatment.     • Your mouth is bleeding.     • You cannot eat or drink because of pain or swelling.     • Your abscess returns.     • You have an injury that causes a crack in your tooth.     • You have questions or concerns about your condition or care.     Medicines: You may  need any of the following:  • Antibiotics  help treat a bacterial infection.      • NSAIDs , such as ibuprofen, help decrease swelling, pain, and fever. This medicine is available with or without a doctor's order. NSAIDs can cause stomach bleeding or kidney problems in certain people. If you take blood thinner medicine, always ask your healthcare provider if NSAIDs are safe for you. Always read the medicine label and follow directions.     • Acetaminophen  decreases pain and fever. It is available without a doctor's order. Ask how much to take and how often to take it.  Follow directions. Read the labels of all other medicines you are using to see if they also contain acetaminophen, or ask your doctor or pharmacist. Acetaminophen can cause liver damage if not taken correctly.     • Prescription pain medicine  may be given. Ask your healthcare provider how to take this medicine safely. Some prescription pain medicines contain acetaminophen. Do not take other medicines that contain acetaminophen without talking to your healthcare provider. Too much acetaminophen may cause liver damage. Prescription pain medicine may cause constipation. Ask your healthcare provider how to prevent or treat constipation.      • Take your medicine as directed. Contact your healthcare provider if you think your medicine is not helping or if you have side effects. Tell your provider if you are allergic to any medicine. Keep a list of the medicines, vitamins, and herbs you take. Include the amounts, and when and why you take them. Bring the list or the pill bottles to follow-up visits. Carry your medicine list with you in case of an emergency.     Self-care:   • Rinse your mouth every 2 hours with salt water. This will help keep the area clean.      • Gently brush your teeth twice a day with a soft tooth brush. This will help keep the area clean.      • Eat soft foods as directed. Soft foods may cause less pain. Examples include applesauce, yogurt, and cooked pasta. Ask your healthcare provider how long to follow this instruction.      • Apply a warm compress to your tooth or gum. Use a cotton ball or gauze soaked in warm water. Remove the compress in 10 minutes or when it becomes cool. Repeat 3 times a day.     Prevent another abscess:   • Brush your teeth at least 2 times a day  with fluoride toothpaste.     • Use dental floss at least once a day  to clean between your teeth.     • Rinse your mouth with water or mouthwash  after meals and snacks.  Chew sugarless gum.     • Avoid sugary and starchy food that can stick between your teeth. Limit drinks high in sugar, such as soda or fruit juice.     • See your dentist every 6 months  for dental cleanings and oral exams.     Follow up with your doctor or dentist in 24 hours, or as directed: Your healthcare provider will need to check your teeth and gums. Write down your questions so you remember to ask them during your visits. © Copyright [employer] 2022 Information is for End User's use only and may not be sold, redistributed or otherwise used for commercial purposes. The above information is an  only. It is not intended as medical advice for individual conditions or treatments. Talk to your doctor, nurse or pharmacist before following any medical regimen to see if it is safe and effective for you.            Follow up with PCP in 3-5 days. Proceed to  ER if symptoms worsen. Chief Complaint     Chief Complaint   Patient presents with   • Dental Pain     Possible mouth abscess on upper right side of inside of mouth           History of Present Illness       Patient is a 23 y.o. female with PMH significant for dental caries/abscess who presents for evaluation of right upper dental swelling and suspected abscess since this morning. She denies pain, fever, drainage. She does have a dentist with whom she plans to follow up. Dental Pain   Pertinent negatives include no fever or sinus pressure. Review of Systems   Review of Systems   Constitutional: Negative for fatigue and fever. HENT: Positive for dental problem. Negative for congestion, ear discharge, ear pain, postnasal drip, rhinorrhea, sinus pressure, sinus pain, sneezing and sore throat. Eyes: Negative. Negative for pain, discharge, redness and itching. Respiratory: Negative. Negative for apnea, cough, choking, chest tightness, shortness of breath, wheezing and stridor. Cardiovascular: Negative. Negative for chest pain and palpitations. Gastrointestinal: Negative.   Negative for diarrhea, nausea and vomiting. Endocrine: Negative. Negative for polydipsia, polyphagia and polyuria. Genitourinary: Negative. Negative for decreased urine volume and flank pain. Musculoskeletal: Negative. Negative for arthralgias, back pain, myalgias, neck pain and neck stiffness. Skin: Negative. Negative for color change and rash. Allergic/Immunologic: Negative. Negative for environmental allergies. Neurological: Negative. Negative for dizziness, facial asymmetry, light-headedness, numbness and headaches. Hematological: Negative. Negative for adenopathy. Psychiatric/Behavioral: Negative.           Current Medications       Current Outpatient Medications:   •  clindamycin (CLEOCIN) 75 mg/5 mL solution, Take 10 mL (150 mg total) by mouth 3 (three) times a day for 7 days, Disp: 210 mL, Rfl: 0  •  omeprazole (PriLOSEC) 20 mg delayed release capsule, Take 20 mg by mouth daily, Disp: , Rfl:   •  Acetaminophen (TYLENOL DISSOLVE PACKS PO), Take by mouth (Patient not taking: Reported on 7/26/2023), Disp: , Rfl:   •  chlorhexidine (PERIDEX) 0.12 % solution, Apply 15 mL to the mouth or throat 2 (two) times a day (Patient not taking: Reported on 5/4/2023), Disp: 120 mL, Rfl: 0  •  famotidine (PEPCID) 20 mg tablet, Take 1 tablet (20 mg total) by mouth daily (Patient not taking: Reported on 7/26/2023), Disp: 30 tablet, Rfl: 0  •  ibuprofen (MOTRIN) 600 mg tablet, Take 1 tablet (600 mg total) by mouth every 6 (six) hours as needed for mild pain (Patient not taking: Reported on 5/4/2023), Disp: 30 tablet, Rfl: 0  •  lansoprazole (PREVACID SOLUTAB) 30 mg disintegrating tablet, Take 1 tablet (30 mg total) by mouth daily, Disp: 60 tablet, Rfl: 0  •  Lidocaine Viscous HCl (XYLOCAINE) 2 % mucosal solution, Swish and spit 10 mL 4 (four) times a day as needed for mouth pain or discomfort (Patient not taking: Reported on 5/4/2023), Disp: 100 mL, Rfl: 1    Current Allergies     Allergies as of 07/26/2023 - Reviewed 07/26/2023   Allergen Reaction Noted   • Amoxicillin Vomiting 01/30/2022            The following portions of the patient's history were reviewed and updated as appropriate: allergies, current medications, past family history, past medical history, past social history, past surgical history and problem list.     Past Medical History:   Diagnosis Date   • Migraine    • Urinary tract infection     at age 12 or 8       No past surgical history on file. Family History   Problem Relation Age of Onset   • Cancer Mother    • No Known Problems Father    • Heart disease Maternal Grandmother    • Diabetes Maternal Grandmother    • Cancer Maternal Grandfather          Medications have been verified. Objective   /69 (BP Location: Left arm, Patient Position: Sitting, Cuff Size: Adult)   Pulse 72   Temp 98.8 °F (37.1 °C) (Tympanic)   Resp 14   SpO2 100%        Physical Exam     Physical Exam  Vitals and nursing note reviewed. Constitutional:       General: She is not in acute distress. Appearance: Normal appearance. She is not ill-appearing, toxic-appearing or diaphoretic. HENT:      Head: Normocephalic and atraumatic. Right Ear: External ear normal.      Left Ear: External ear normal.      Nose: Nose normal. No congestion or rhinorrhea. Mouth/Throat:      Mouth: Mucous membranes are moist.      Dentition: Abnormal dentition. Dental tenderness, gingival swelling, dental caries and dental abscesses present. Comments: Dental abscess/ginigival swelling above right upper canine. Diffusely poor dentition with multiple caries   (+) Mild selling of right upper lip. Eyes:      Extraocular Movements: Extraocular movements intact. Conjunctiva/sclera: Conjunctivae normal.      Pupils: Pupils are equal, round, and reactive to light. Cardiovascular:      Rate and Rhythm: Normal rate and regular rhythm. Pulses: Normal pulses. Heart sounds: Normal heart sounds.  No murmur heard.     No friction rub. No gallop. Pulmonary:      Effort: Pulmonary effort is normal. No respiratory distress. Breath sounds: Normal breath sounds. No stridor. No wheezing, rhonchi or rales. Abdominal:      General: Bowel sounds are normal.      Palpations: Abdomen is soft. Tenderness: There is no abdominal tenderness. There is no guarding or rebound. Musculoskeletal:         General: Normal range of motion. Cervical back: Normal range of motion and neck supple. No tenderness. Skin:     General: Skin is warm and dry. Capillary Refill: Capillary refill takes less than 2 seconds. Neurological:      General: No focal deficit present. Mental Status: She is alert and oriented to person, place, and time. Cranial Nerves: No cranial nerve deficit.    Psychiatric:         Mood and Affect: Mood normal.         Behavior: Behavior normal.

## 2023-07-31 ENCOUNTER — CONSULT (OUTPATIENT)
Dept: GASTROENTEROLOGY | Facility: AMBULARY SURGERY CENTER | Age: 23
End: 2023-07-31
Payer: MEDICARE

## 2023-07-31 VITALS
BODY MASS INDEX: 18.86 KG/M2 | HEIGHT: 65 IN | HEART RATE: 84 BPM | DIASTOLIC BLOOD PRESSURE: 72 MMHG | SYSTOLIC BLOOD PRESSURE: 112 MMHG | OXYGEN SATURATION: 98 % | WEIGHT: 113.2 LBS

## 2023-07-31 DIAGNOSIS — R13.10 DIFFICULTY SWALLOWING SOLIDS: ICD-10-CM

## 2023-07-31 DIAGNOSIS — K21.9 GASTROESOPHAGEAL REFLUX DISEASE, UNSPECIFIED WHETHER ESOPHAGITIS PRESENT: Primary | ICD-10-CM

## 2023-07-31 PROCEDURE — 99204 OFFICE O/P NEW MOD 45 MIN: CPT | Performed by: INTERNAL MEDICINE

## 2023-07-31 RX ORDER — OMEPRAZOLE 20 MG/1
20 CAPSULE, DELAYED RELEASE ORAL
Qty: 180 CAPSULE | Refills: 1 | Status: SHIPPED | OUTPATIENT
Start: 2023-07-31 | End: 2023-10-29

## 2023-07-31 NOTE — PATIENT INSTRUCTIONS
Scheduled date of EGD(as of today): 9/12/23  Physician performing EGD: Latisha  Location of EGD: [facility]  Instructions reviewed with patient by: Aditi Weber  Clearances: none

## 2023-07-31 NOTE — PROGRESS NOTES
Consultation - [address]  [facility]  Tc Hand 23 y.o. female MRN: [id number]  Unit/Bed#:  Encounter: [id number]        Consults    ASSESSMENT/PLAN:     1. Dysphagia-possibly esophageal dysmotility such as achalasia/stricture versus EOE versus less likely esophageal malignancy.  -Patient reports some improvement with dissolvable omeprazole 20 mg however not complete resolution, reports that she is currently on. Diet. Given that these are recurrent symptoms and patient has associated weight loss, would recommend endoscopic evaluation at this time.  -Would also recommend increase omeprazole to 20 mg twice daily.  -Check thyroid function, check CBC and CMP. -No oral thrush noted on exam.  -Patient is currently on antibiotics for dental abscess however symptoms have preceded this.      ______________________________________________________________________    Reason for Consult / Principal Problem: [unfilled]    HPI: Tc Hand is a 23y.o. year old female with history of anxiety, presents for evaluation of dysphagia. Patient reports that she has been having the symptoms for nearly 5 months. Patient was seen by the PCP and was started on PPI as famotidine only resulted in minimal improvement. Patient is currently only tolerating puréed diet. She reports that she is currently on omeprazole 20 mg which has been helping somewhat but has not completely resolved her symptoms. She denies any hematemesis, coffee-ground emesis or melena. She does report some associated weight loss. Patient reports that she had similar symptoms 2 years ago, thinks that those symptoms started after what sounds  starting on a medication. Patient reports that she has sensation of something in the back of her throat. She was also recently diagnosed with dental abscess and is currently on antibiotics. She is currently on clindamycin. Review of Systems:   The remainder of the review of systems was negative except for the pertinent positives noted in HPI. Historical Information   Past Medical History:   Diagnosis Date   • Migraine    • Urinary tract infection     at age 12 or 10     No past surgical history on file. Social History   Social History     Substance and Sexual Activity   Alcohol Use No     Social History     Substance and Sexual Activity   Drug Use No     Social History     Tobacco Use   Smoking Status Never   Smokeless Tobacco Never     Family History   Problem Relation Age of Onset   • Cancer Mother    • No Known Problems Father    • Heart disease Maternal Grandmother    • Diabetes Maternal Grandmother    • Cancer Maternal Grandfather        Meds/Allergies     (Not in a hospital admission)    No current facility-administered medications for this visit. Allergies   Allergen Reactions   • Amoxicillin Vomiting       Objective     There were no vitals taken for this visit. [unfilled]    PHYSICAL EXAM     GEN: well nourished, well developed, no acute distress  HEENT: anicteric, MMM, no cervical or supraclavicular lymphadenopathy  CV: RRR, no m/r/g  CHEST: CTA b/l, no WRR  ABD: +BS, soft, NT/ND, no hepatosplenomegaly  EXT: no c/c/e  SKIN: no rashes,  NEURO: aaox3    Lab Results:   No visits with results within 1 Day(s) from this visit.    Latest known visit with results is:   Admission on 07/31/2021, Discharged on 07/31/2021   Component Date Value   • EXT PREG TEST UR (Ref: N* 07/31/2021 negative    • Control 07/31/2021 valid      Imaging Studies: I have personally reviewed pertinent films in PACS                Answers for HPI/ROS submitted by the patient on 7/31/2023  Chronicity: recurrent  Onset: more than 1 year ago  Onset quality: sudden  Frequency: intermittently  Episode duration: 3 Days  Progression since onset: unchanged  Pain location: LLQ, RLQ  Pain - numeric: 3/10  Pain quality: aching, cramping  Radiates to: pelvis  anorexia: No  arthralgias: No  constipation: Yes  diarrhea: No  dysuria: No  fever: No  flatus: No  frequency: No  headaches: Yes  hematochezia: No  hematuria: No  melena: No  myalgias: Yes  nausea:  No  weight loss: No  vomiting: No  Aggravated by: certain positions, movement  Relieved by: being still, recumbency

## 2023-09-06 ENCOUNTER — APPOINTMENT (OUTPATIENT)
Dept: LAB | Facility: CLINIC | Age: 23
End: 2023-09-06
Payer: MEDICARE

## 2023-09-06 DIAGNOSIS — R13.10 DIFFICULTY SWALLOWING SOLIDS: ICD-10-CM

## 2023-09-06 DIAGNOSIS — R42 LIGHTHEADEDNESS: ICD-10-CM

## 2023-09-06 DIAGNOSIS — Z11.8 SCREENING FOR CHLAMYDIAL DISEASE: ICD-10-CM

## 2023-09-06 DIAGNOSIS — F41.9 ANXIETY: ICD-10-CM

## 2023-09-06 DIAGNOSIS — Z11.59 NEED FOR HEPATITIS C SCREENING TEST: ICD-10-CM

## 2023-09-06 DIAGNOSIS — Z11.4 SCREENING FOR HIV (HUMAN IMMUNODEFICIENCY VIRUS): ICD-10-CM

## 2023-09-06 LAB
ALBUMIN SERPL BCP-MCNC: 4 G/DL (ref 3.5–5)
ALP SERPL-CCNC: 63 U/L (ref 34–104)
ALT SERPL W P-5'-P-CCNC: 13 U/L (ref 7–52)
ANION GAP SERPL CALCULATED.3IONS-SCNC: 5 MMOL/L
AST SERPL W P-5'-P-CCNC: 19 U/L (ref 13–39)
BASOPHILS # BLD AUTO: 0.08 THOUSANDS/ÂΜL (ref 0–0.1)
BASOPHILS NFR BLD AUTO: 2 % (ref 0–1)
BILIRUB SERPL-MCNC: 0.31 MG/DL (ref 0.2–1)
BUN SERPL-MCNC: 6 MG/DL (ref 5–25)
CALCIUM SERPL-MCNC: 8.8 MG/DL (ref 8.4–10.2)
CHLORIDE SERPL-SCNC: 108 MMOL/L (ref 96–108)
CO2 SERPL-SCNC: 26 MMOL/L (ref 21–32)
CREAT SERPL-MCNC: 0.6 MG/DL (ref 0.6–1.3)
EOSINOPHIL # BLD AUTO: 0.23 THOUSAND/ÂΜL (ref 0–0.61)
EOSINOPHIL NFR BLD AUTO: 5 % (ref 0–6)
ERYTHROCYTE [DISTWIDTH] IN BLOOD BY AUTOMATED COUNT: 12.3 % (ref 11.6–15.1)
GFR SERPL CREATININE-BSD FRML MDRD: 131 ML/MIN/1.73SQ M
GLUCOSE P FAST SERPL-MCNC: 88 MG/DL (ref 65–99)
HCT VFR BLD AUTO: 37 % (ref 34.8–46.1)
HCV AB SER QL: NORMAL
HGB BLD-MCNC: 12.1 G/DL (ref 11.5–15.4)
HIV 1+2 AB+HIV1 P24 AG SERPL QL IA: NORMAL
HIV 2 AB SERPL QL IA: NORMAL
HIV1 AB SERPL QL IA: NORMAL
HIV1 P24 AG SERPL QL IA: NORMAL
IMM GRANULOCYTES # BLD AUTO: 0 THOUSAND/UL (ref 0–0.2)
IMM GRANULOCYTES NFR BLD AUTO: 0 % (ref 0–2)
LYMPHOCYTES # BLD AUTO: 1.99 THOUSANDS/ÂΜL (ref 0.6–4.47)
LYMPHOCYTES NFR BLD AUTO: 42 % (ref 14–44)
MCH RBC QN AUTO: 28.9 PG (ref 26.8–34.3)
MCHC RBC AUTO-ENTMCNC: 32.7 G/DL (ref 31.4–37.4)
MCV RBC AUTO: 88 FL (ref 82–98)
MONOCYTES # BLD AUTO: 0.42 THOUSAND/ÂΜL (ref 0.17–1.22)
MONOCYTES NFR BLD AUTO: 9 % (ref 4–12)
NEUTROPHILS # BLD AUTO: 2.01 THOUSANDS/ÂΜL (ref 1.85–7.62)
NEUTS SEG NFR BLD AUTO: 42 % (ref 43–75)
NRBC BLD AUTO-RTO: 0 /100 WBCS
PLATELET # BLD AUTO: 245 THOUSANDS/UL (ref 149–390)
PMV BLD AUTO: 11 FL (ref 8.9–12.7)
POTASSIUM SERPL-SCNC: 3.6 MMOL/L (ref 3.5–5.3)
PROT SERPL-MCNC: 6.7 G/DL (ref 6.4–8.4)
RBC # BLD AUTO: 4.19 MILLION/UL (ref 3.81–5.12)
SODIUM SERPL-SCNC: 139 MMOL/L (ref 135–147)
TSH SERPL DL<=0.05 MIU/L-ACNC: 3.78 UIU/ML (ref 0.45–4.5)
WBC # BLD AUTO: 4.73 THOUSAND/UL (ref 4.31–10.16)

## 2023-09-06 PROCEDURE — 87389 HIV-1 AG W/HIV-1&-2 AB AG IA: CPT

## 2023-09-06 PROCEDURE — 86803 HEPATITIS C AB TEST: CPT

## 2023-09-06 PROCEDURE — 80053 COMPREHEN METABOLIC PANEL: CPT

## 2023-09-06 PROCEDURE — 36415 COLL VENOUS BLD VENIPUNCTURE: CPT

## 2023-09-06 PROCEDURE — 87491 CHLMYD TRACH DNA AMP PROBE: CPT

## 2023-09-06 PROCEDURE — 84443 ASSAY THYROID STIM HORMONE: CPT

## 2023-09-06 PROCEDURE — 87591 N.GONORRHOEAE DNA AMP PROB: CPT

## 2023-09-06 PROCEDURE — 85025 COMPLETE CBC W/AUTO DIFF WBC: CPT

## 2023-09-07 LAB
C TRACH DNA SPEC QL NAA+PROBE: NEGATIVE
N GONORRHOEA DNA SPEC QL NAA+PROBE: NEGATIVE

## 2023-09-12 ENCOUNTER — ANESTHESIA EVENT (OUTPATIENT)
Dept: GASTROENTEROLOGY | Facility: AMBULARY SURGERY CENTER | Age: 23
End: 2023-09-12

## 2023-09-12 ENCOUNTER — ANESTHESIA (OUTPATIENT)
Dept: GASTROENTEROLOGY | Facility: AMBULARY SURGERY CENTER | Age: 23
End: 2023-09-12

## 2023-09-12 ENCOUNTER — HOSPITAL ENCOUNTER (OUTPATIENT)
Dept: GASTROENTEROLOGY | Facility: AMBULARY SURGERY CENTER | Age: 23
Setting detail: OUTPATIENT SURGERY
Discharge: HOME | End: 2023-09-12
Attending: INTERNAL MEDICINE | Admitting: INTERNAL MEDICINE
Payer: MEDICARE

## 2023-09-12 VITALS
WEIGHT: 116 LBS | BODY MASS INDEX: 19.33 KG/M2 | TEMPERATURE: 98.8 F | RESPIRATION RATE: 18 BRPM | HEIGHT: 65 IN | SYSTOLIC BLOOD PRESSURE: 119 MMHG | DIASTOLIC BLOOD PRESSURE: 68 MMHG | HEART RATE: 78 BPM | OXYGEN SATURATION: 100 %

## 2023-09-12 DIAGNOSIS — R13.10 DIFFICULTY SWALLOWING SOLIDS: ICD-10-CM

## 2023-09-12 DIAGNOSIS — K21.9 GASTROESOPHAGEAL REFLUX DISEASE, UNSPECIFIED WHETHER ESOPHAGITIS PRESENT: ICD-10-CM

## 2023-09-12 LAB
EXT PREGNANCY TEST URINE: NEGATIVE
EXT. CONTROL: NORMAL

## 2023-09-12 PROCEDURE — 88305 TISSUE EXAM BY PATHOLOGIST: CPT | Performed by: PATHOLOGY

## 2023-09-12 PROCEDURE — 81025 URINE PREGNANCY TEST: CPT | Performed by: INTERNAL MEDICINE

## 2023-09-12 RX ORDER — PROPOFOL 10 MG/ML
INJECTION, EMULSION INTRAVENOUS AS NEEDED
Status: DISCONTINUED | OUTPATIENT
Start: 2023-09-12 | End: 2023-09-12

## 2023-09-12 RX ORDER — FENTANYL CITRATE 50 UG/ML
INJECTION, SOLUTION INTRAMUSCULAR; INTRAVENOUS AS NEEDED
Status: DISCONTINUED | OUTPATIENT
Start: 2023-09-12 | End: 2023-09-12

## 2023-09-12 RX ORDER — SODIUM CHLORIDE, SODIUM LACTATE, POTASSIUM CHLORIDE, CALCIUM CHLORIDE 600; 310; 30; 20 MG/100ML; MG/100ML; MG/100ML; MG/100ML
INJECTION, SOLUTION INTRAVENOUS CONTINUOUS PRN
Status: DISCONTINUED | OUTPATIENT
Start: 2023-09-12 | End: 2023-09-12

## 2023-09-12 RX ORDER — LIDOCAINE HYDROCHLORIDE 20 MG/ML
INJECTION, SOLUTION EPIDURAL; INFILTRATION; INTRACAUDAL; PERINEURAL AS NEEDED
Status: DISCONTINUED | OUTPATIENT
Start: 2023-09-12 | End: 2023-09-12

## 2023-09-12 RX ADMIN — SODIUM CHLORIDE, SODIUM LACTATE, POTASSIUM CHLORIDE, AND CALCIUM CHLORIDE: .6; .31; .03; .02 INJECTION, SOLUTION INTRAVENOUS at 14:04

## 2023-09-12 RX ADMIN — FENTANYL CITRATE 50 MCG: 50 INJECTION INTRAMUSCULAR; INTRAVENOUS at 14:14

## 2023-09-12 RX ADMIN — PROPOFOL 30 MG: 10 INJECTION, EMULSION INTRAVENOUS at 14:20

## 2023-09-12 RX ADMIN — LIDOCAINE HYDROCHLORIDE 100 MG: 20 INJECTION, SOLUTION EPIDURAL; INFILTRATION; INTRACAUDAL; PERINEURAL at 14:16

## 2023-09-12 RX ADMIN — PROPOFOL 150 MG: 10 INJECTION, EMULSION INTRAVENOUS at 14:16

## 2023-09-12 NOTE — ANESTHESIA PREPROCEDURE EVALUATION
Procedure:  EGD    Relevant Problems   GI/HEPATIC   (+) Acid reflux      MUSCULOSKELETAL   (+) Strain of lumbar region      NEURO/PSYCH   (+) Anxiety, generalized        Physical Exam    Airway    Mallampati score: II  TM Distance: >3 FB  Neck ROM: full     Dental   Comment: Poor dentition,     Cardiovascular      Pulmonary      Other Findings        Anesthesia Plan  ASA Score- 2     Anesthesia Type- IV sedation with anesthesia with ASA Monitors. Additional Monitors:   Airway Plan:           Plan Factors-Exercise tolerance (METS): >4 METS. Chart reviewed. Patient summary reviewed. Patient is a current smoker (vape). Patient smoked on day of surgery (vape). Induction- intravenous. Postoperative Plan-     Informed Consent- Anesthetic plan and risks discussed with patient. I personally reviewed this patient with the CRNA. Discussed and agreed on the Anesthesia Plan with the CRNA. Obdulio Quan

## 2023-09-12 NOTE — H&P
History and Physical -  [facility]  Akshat Adams 26 y.o. female MRN: [id number]    HPI: Akshat Adams is a 26y.o. year old female who presents for evaluation of dysphagia symptoms. Review of Systems    Historical Information   Past Medical History:   Diagnosis Date   • Migraine    • Urinary tract infection     at age 12 or 8     History reviewed. No pertinent surgical history. Social History   Social History     Substance and Sexual Activity   Alcohol Use Yes    Comment: rarely     Social History     Substance and Sexual Activity   Drug Use No     Social History     Tobacco Use   Smoking Status Never   Smokeless Tobacco Never     Family History   Problem Relation Age of Onset   • Cancer Mother    • No Known Problems Father    • Heart disease Maternal Grandmother    • Diabetes Maternal Grandmother    • Cancer Maternal Grandfather        Meds/Allergies     (Not in a hospital admission)      Allergies   Allergen Reactions   • Amoxicillin Vomiting       Objective     /81   Pulse 100   Temp 98.5 °F (36.9 °C) (Temporal)   Resp 18   Ht 5' 5" (1.651 m)   Wt 52.6 kg (116 lb)   SpO2 98%   BMI 19.30 kg/m²       PHYSICAL EXAM    Gen: NAD  CV: RRR  CHEST: Clear  ABD: soft, NT/ND  EXT: no edema  Neuro: AAO      ASSESSMENT/PLAN:  This is a 26y.o. year old female here for evaluation of dysphagia symptoms. PLAN:   Procedure: EGD.

## 2023-09-13 ENCOUNTER — OFFICE VISIT (OUTPATIENT)
Dept: URGENT CARE | Age: 23
End: 2023-09-13
Payer: MEDICARE

## 2023-09-13 ENCOUNTER — TELEPHONE (OUTPATIENT)
Dept: FAMILY MEDICINE CLINIC | Facility: OTHER | Age: 23
End: 2023-09-13

## 2023-09-13 VITALS
OXYGEN SATURATION: 99 % | SYSTOLIC BLOOD PRESSURE: 123 MMHG | TEMPERATURE: 98.4 F | HEART RATE: 84 BPM | RESPIRATION RATE: 18 BRPM | DIASTOLIC BLOOD PRESSURE: 74 MMHG

## 2023-09-13 DIAGNOSIS — K08.89 TOOTHACHE: Primary | ICD-10-CM

## 2023-09-13 DIAGNOSIS — K04.7 DENTAL ABSCESS: ICD-10-CM

## 2023-09-13 PROCEDURE — 99213 OFFICE O/P EST LOW 20 MIN: CPT | Performed by: NURSE PRACTITIONER

## 2023-09-13 RX ORDER — CLINDAMYCIN PALMITATE HYDROCHLORIDE 75 MG/5ML
20 SOLUTION ORAL 3 TIMES DAILY
Qty: 420 ML | Refills: 0 | Status: SHIPPED | OUTPATIENT
Start: 2023-09-13 | End: 2023-09-20

## 2023-09-13 NOTE — PROGRESS NOTES
[facility]        NAME: Melissa Dwyer is a 26 y.o. female  : 2000    MRN: [id number]  DATE: 2023  TIME: 12:47 PM    Assessment and Plan   Toothache [K08.89]  1. Toothache  clindamycin (CLEOCIN) 75 mg/5 mL solution            Patient Instructions     Take medication as prescribed  Keep appointment with dentist scheduled for tomorrow  Proceed to  ER if symptoms worsen. Chief Complaint     Chief Complaint   Patient presents with   • Dental Pain     Patient states that she got an abscess on the top left inside of her gumline. She states she has pain and swelling on her left side of the face. She goes to a specialist tomorrow but wanted something for the infection          History of Present Illness       HPI   Presents to clinic with complaint of tooth ache, which she thinks may be an abscess on the left upper gums. Was treated about 2 months ago with clindamycin for similar symptoms but that time on the right upper gum. States she has an appointment with dentist for tomorrow. Called her PCP who did not want to prescribe antibiotics until a physical evaluation is done. Reports pain which is worse with eating, better at rest but is not 0/10. Requesting antibiotics to start treatment before going to the dentist tomorrow. Review of Systems   Review of Systems   Constitutional: Negative for fever. HENT: Positive for dental problem (right upper gum). Negative for postnasal drip and sore throat. Gastrointestinal: Negative for diarrhea and vomiting.          Current Medications       Current Outpatient Medications:   •  Acetaminophen (TYLENOL DISSOLVE PACKS PO), Take by mouth, Disp: , Rfl:   •  clindamycin (CLEOCIN) 75 mg/5 mL solution, Take 20 mL (300 mg total) by mouth 3 (three) times a day for 7 days, Disp: 420 mL, Rfl: 0  •  omeprazole (PriLOSEC) 20 mg delayed release capsule, Take 1 capsule (20 mg total) by mouth 2 (two) times a day before meals, Disp: 180 capsule, Rfl: 1  No current facility-administered medications for this visit. Current Allergies     Allergies as of 09/13/2023 - Reviewed 09/13/2023   Allergen Reaction Noted   • Amoxicillin Vomiting 01/30/2022            The following portions of the patient's history were reviewed and updated as appropriate: allergies, current medications, past family history, past medical history, past social history, past surgical history and problem list.     Past Medical History:   Diagnosis Date   • Migraine    • Urinary tract infection     at age 12 or 10       No past surgical history on file. Family History   Problem Relation Age of Onset   • Cancer Mother    • No Known Problems Father    • Heart disease Maternal Grandmother    • Diabetes Maternal Grandmother    • Cancer Maternal Grandfather          Medications have been verified. Objective   /74   Pulse 84   Temp 98.4 °F (36.9 °C)   Resp 18   SpO2 99%   No LMP recorded. Physical Exam     Physical Exam  HENT:      Head: Atraumatic. Mouth/Throat:      Comments: Dental caries, several teeth. Tenderness with palpation of the right upper gums  Musculoskeletal:         General: Swelling (mild, left cheek) and tenderness (with palpation of affected area) present. No deformity. Skin:     Findings: No bruising or erythema.

## 2023-09-13 NOTE — TELEPHONE ENCOUNTER
Britttressa this is Sheela Marvin date of birth [birth year]. I was wondering if I would be able to get a prescription refill or authorization for liquid Clindamycin because I have a tooth Abscess but I do go to the dentist on Thursday. I just wanted to see if I would be able to have it refilled instead of going to an urgent care again. Jaswant Luo 2000 and a phone number would be [phone]. Thank you.

## 2023-09-13 NOTE — TELEPHONE ENCOUNTER
Unfortunately we do not prescribe abx without first evaluating an issue such as this. Please offer appointment.

## 2023-09-15 PROCEDURE — 88305 TISSUE EXAM BY PATHOLOGIST: CPT | Performed by: PATHOLOGY

## 2023-12-12 RX ORDER — CLINDAMYCIN PALMITATE HYDROCHLORIDE 75 MG/5ML
300 SOLUTION ORAL 3 TIMES DAILY
Qty: 300 ML | Refills: 1 | OUTPATIENT
Start: 2023-12-12

## 2024-01-07 ENCOUNTER — OFFICE VISIT (OUTPATIENT)
Dept: URGENT CARE | Age: 24
End: 2024-01-07
Payer: MEDICARE

## 2024-01-07 VITALS
DIASTOLIC BLOOD PRESSURE: 81 MMHG | HEIGHT: 64 IN | TEMPERATURE: 98.8 F | RESPIRATION RATE: 18 BRPM | OXYGEN SATURATION: 99 % | SYSTOLIC BLOOD PRESSURE: 115 MMHG | WEIGHT: 120 LBS | BODY MASS INDEX: 20.49 KG/M2 | HEART RATE: 97 BPM

## 2024-01-07 DIAGNOSIS — K02.9 DENTAL CARIES: ICD-10-CM

## 2024-01-07 DIAGNOSIS — K08.89 PAIN, DENTAL: Primary | ICD-10-CM

## 2024-01-07 PROCEDURE — 99213 OFFICE O/P EST LOW 20 MIN: CPT

## 2024-01-07 RX ORDER — CLINDAMYCIN PALMITATE HYDROCHLORIDE 75 MG/5ML
450 SOLUTION ORAL 3 TIMES DAILY
Qty: 630 ML | Refills: 0 | Status: SHIPPED | OUTPATIENT
Start: 2024-01-07 | End: 2024-01-14

## 2024-01-07 RX ORDER — DIPHENHYDRAMINE HCL 25 MG
25 CAPSULE ORAL EVERY 6 HOURS PRN
COMMUNITY

## 2024-01-07 NOTE — PATIENT INSTRUCTIONS
Take amoxicillin as prescribed  Follow up with dentist  Salt water gargles  Ice over area  Ibuprofen 600-800mg + Tylenol 1000mg every 6 hours provided you do not have a history of kidney or liver dysfunction. Do not exceed this amount.  Follow up with PCP in 3-5 days.  Proceed to ER if symptoms worsen.     Eat yogurt with live and active cultures and/or take a probiotic at least 3 hours before or after antibiotic dose. Monitor stool for diarrhea and/or blood. If this occurs, contact primary care doctor ASAP.

## 2024-01-07 NOTE — PROGRESS NOTES
"  [facility]        NAME: Karen Napier is a 23 y.o. female  : 2000    MRN: [id number]  DATE: 2024  TIME: 6:01 PM    Assessment and Plan   Pain, dental [K08.89]  1. Pain, dental  Ambulatory Referral to Dentistry    clindamycin (CLEOCIN) 75 mg/5 mL solution      2. Dental caries  Ambulatory Referral to Dentistry            Patient Instructions     Take amoxicillin as prescribed  Follow up with dentist  Salt water gargles  Ice over area  Ibuprofen 600-800mg + Tylenol 1000mg every 6 hours provided you do not have a history of kidney or liver dysfunction. Do not exceed this amount.  Follow up with PCP in 3-5 days.  Proceed to ER if symptoms worsen.     Eat yogurt with live and active cultures and/or take a probiotic at least 3 hours before or after antibiotic dose. Monitor stool for diarrhea and/or blood. If this occurs, contact primary care doctor ASAP.    Chief Complaint     Chief Complaint   Patient presents with    Dental Pain     Dental pain began yesterday         History of Present Illness       Patient is a 24 yo female with no significant PMH presenting in the clinic today for dental pain x 1 day. Patient notes dental pain along the left side lower incisors. Patient notes prior \"cracked tooth\" at that area. Patient states she does not see a dentist on a regular basis but \"has an appointment set up in a couple of weeks\". Admits 1 epsiode of non-bloody emesis yesterday. Denies fever, chills, sore throat, difficulty swallowing, facial swelling, drooling, trismus, headache, ear pain, neck pain, chest pain, SOB, abdominal pain, and diarrhea. Admits the use of tylenol for sx management.        Review of Systems   Review of Systems   Constitutional:  Negative for chills and fever.   HENT:  Positive for dental problem. Negative for drooling, facial swelling, sore throat and trouble swallowing.    Respiratory:  Negative for shortness of breath.    Cardiovascular:  Negative for chest pain. " Patient called stating that our office was helping him to get his CT rescheduled as he needs to have it on an evening prior to dialysis not on the same day as dialysis due to the contrast.  He was wondering if this has been taken care of. Informed patient that the coordinator that is working on this is out of the office today and will follow up with them tomorrow.  Patient agreeable to the plan.    "  Musculoskeletal:  Negative for neck pain.   Neurological:  Negative for dizziness and headaches.         Current Medications       Current Outpatient Medications:     Acetaminophen (TYLENOL DISSOLVE PACKS PO), Take by mouth, Disp: , Rfl:     clindamycin (CLEOCIN) 75 mg/5 mL solution, Take 30 mL (450 mg total) by mouth 3 (three) times a day for 7 days, Disp: 630 mL, Rfl: 0    omeprazole (PriLOSEC) 20 mg delayed release capsule, Take 1 capsule (20 mg total) by mouth 2 (two) times a day before meals, Disp: 180 capsule, Rfl: 1    diphenhydrAMINE (BENADRYL) 25 mg capsule, Take 25 mg by mouth every 6 (six) hours as needed for itching, Disp: , Rfl:     Current Allergies     Allergies as of 01/07/2024 - Reviewed 01/07/2024   Allergen Reaction Noted    Amoxicillin Vomiting 01/30/2022            The following portions of the patient's history were reviewed and updated as appropriate: allergies, current medications, past family history, past medical history, past social history, past surgical history and problem list.     Past Medical History:   Diagnosis Date    Migraine     Urinary tract infection     at age 8 or 10       History reviewed. No pertinent surgical history.    Family History   Problem Relation Age of Onset    Cancer Mother     No Known Problems Father     Heart disease Maternal Grandmother     Diabetes Maternal Grandmother     Cancer Maternal Grandfather          Medications have been verified.        Objective   /81   Pulse 97   Temp 98.8 °F (37.1 °C)   Resp 18   Ht 5' 4\" (1.626 m)   Wt 54.4 kg (120 lb)   SpO2 99%   BMI 20.60 kg/m²        Physical Exam     Physical Exam  Vitals reviewed.   Constitutional:       General: She is not in acute distress.     Appearance: Normal appearance. She is normal weight. She is not ill-appearing.   HENT:      Head: Normocephalic.      Nose: Nose normal.      Mouth/Throat:      Lips: Pink.      Mouth: Mucous membranes are moist.      Dentition: Abnormal dentition. " Dental tenderness and dental caries present.      Pharynx: Oropharynx is clear. Uvula midline. No pharyngeal swelling, oropharyngeal exudate, posterior oropharyngeal erythema or uvula swelling.     Eyes:      Conjunctiva/sclera: Conjunctivae normal.   Cardiovascular:      Rate and Rhythm: Normal rate and regular rhythm.      Pulses: Normal pulses.      Heart sounds: Normal heart sounds. No murmur heard.     No friction rub. No gallop.   Pulmonary:      Effort: Pulmonary effort is normal.      Breath sounds: Normal breath sounds. No wheezing, rhonchi or rales.   Musculoskeletal:      Cervical back: Normal range of motion and neck supple.   Skin:     General: Skin is warm.      Findings: No rash.   Neurological:      Mental Status: She is alert.   Psychiatric:         Mood and Affect: Mood normal.         Behavior: Behavior normal.

## 2024-04-28 ENCOUNTER — HOSPITAL ENCOUNTER (EMERGENCY)
Facility: HOSPITAL | Age: 24
Discharge: HOME | End: 2024-04-29
Attending: EMERGENCY MEDICINE
Payer: MEDICARE

## 2024-04-28 VITALS
HEART RATE: 88 BPM | RESPIRATION RATE: 18 BRPM | DIASTOLIC BLOOD PRESSURE: 70 MMHG | OXYGEN SATURATION: 98 % | SYSTOLIC BLOOD PRESSURE: 126 MMHG | TEMPERATURE: 98.5 F

## 2024-04-28 DIAGNOSIS — E87.6 HYPOKALEMIA: ICD-10-CM

## 2024-04-28 DIAGNOSIS — R11.2 NAUSEA AND VOMITING: Primary | ICD-10-CM

## 2024-04-28 DIAGNOSIS — E83.42 HYPOMAGNESEMIA: ICD-10-CM

## 2024-04-28 LAB
ALBUMIN SERPL BCP-MCNC: 4.1 G/DL (ref 3.5–5)
ALP SERPL-CCNC: 60 U/L (ref 34–104)
ALT SERPL W P-5'-P-CCNC: 15 U/L (ref 7–52)
ANION GAP SERPL CALCULATED.3IONS-SCNC: 12 MMOL/L (ref 4–13)
AST SERPL W P-5'-P-CCNC: 19 U/L (ref 13–39)
BASOPHILS # BLD AUTO: 0.06 THOUSANDS/ÂΜL (ref 0–0.1)
BASOPHILS NFR BLD AUTO: 1 % (ref 0–1)
BILIRUB SERPL-MCNC: 0.5 MG/DL (ref 0.2–1)
BUN SERPL-MCNC: 6 MG/DL (ref 5–25)
CALCIUM SERPL-MCNC: 8.7 MG/DL (ref 8.4–10.2)
CHLORIDE SERPL-SCNC: 107 MMOL/L (ref 96–108)
CO2 SERPL-SCNC: 19 MMOL/L (ref 21–32)
CREAT SERPL-MCNC: 0.51 MG/DL (ref 0.6–1.3)
EOSINOPHIL # BLD AUTO: 0.03 THOUSAND/ÂΜL (ref 0–0.61)
EOSINOPHIL NFR BLD AUTO: 0 % (ref 0–6)
ERYTHROCYTE [DISTWIDTH] IN BLOOD BY AUTOMATED COUNT: 12.2 % (ref 11.6–15.1)
GFR SERPL CREATININE-BSD FRML MDRD: 137 ML/MIN/1.73SQ M
GLUCOSE SERPL-MCNC: 92 MG/DL (ref 65–140)
HCT VFR BLD AUTO: 37.2 % (ref 34.8–46.1)
HGB BLD-MCNC: 12.9 G/DL (ref 11.5–15.4)
IMM GRANULOCYTES # BLD AUTO: 0.01 THOUSAND/UL (ref 0–0.2)
IMM GRANULOCYTES NFR BLD AUTO: 0 % (ref 0–2)
LYMPHOCYTES # BLD AUTO: 1.29 THOUSANDS/ÂΜL (ref 0.6–4.47)
LYMPHOCYTES NFR BLD AUTO: 19 % (ref 14–44)
MAGNESIUM SERPL-MCNC: 1.8 MG/DL (ref 1.9–2.7)
MCH RBC QN AUTO: 29.3 PG (ref 26.8–34.3)
MCHC RBC AUTO-ENTMCNC: 34.7 G/DL (ref 31.4–37.4)
MCV RBC AUTO: 85 FL (ref 82–98)
MONOCYTES # BLD AUTO: 0.4 THOUSAND/ÂΜL (ref 0.17–1.22)
MONOCYTES NFR BLD AUTO: 6 % (ref 4–12)
NEUTROPHILS # BLD AUTO: 5.08 THOUSANDS/ÂΜL (ref 1.85–7.62)
NEUTS SEG NFR BLD AUTO: 74 % (ref 43–75)
NRBC BLD AUTO-RTO: 0 /100 WBCS
PLATELET # BLD AUTO: 238 THOUSANDS/UL (ref 149–390)
PMV BLD AUTO: 11.5 FL (ref 8.9–12.7)
POTASSIUM SERPL-SCNC: 3.3 MMOL/L (ref 3.5–5.3)
PROT SERPL-MCNC: 6.9 G/DL (ref 6.4–8.4)
RBC # BLD AUTO: 4.4 MILLION/UL (ref 3.81–5.12)
SODIUM SERPL-SCNC: 138 MMOL/L (ref 135–147)
WBC # BLD AUTO: 6.87 THOUSAND/UL (ref 4.31–10.16)

## 2024-04-28 PROCEDURE — 96366 THER/PROPH/DIAG IV INF ADDON: CPT

## 2024-04-28 PROCEDURE — 96368 THER/DIAG CONCURRENT INF: CPT

## 2024-04-28 PROCEDURE — 36415 COLL VENOUS BLD VENIPUNCTURE: CPT

## 2024-04-28 PROCEDURE — 96365 THER/PROPH/DIAG IV INF INIT: CPT

## 2024-04-28 PROCEDURE — 96375 TX/PRO/DX INJ NEW DRUG ADDON: CPT

## 2024-04-28 PROCEDURE — 80053 COMPREHEN METABOLIC PANEL: CPT

## 2024-04-28 PROCEDURE — C9113 INJ PANTOPRAZOLE SODIUM, VIA: HCPCS

## 2024-04-28 PROCEDURE — 96367 TX/PROPH/DG ADDL SEQ IV INF: CPT

## 2024-04-28 PROCEDURE — 83735 ASSAY OF MAGNESIUM: CPT

## 2024-04-28 PROCEDURE — 99285 EMERGENCY DEPT VISIT HI MDM: CPT | Performed by: EMERGENCY MEDICINE

## 2024-04-28 PROCEDURE — 99284 EMERGENCY DEPT VISIT MOD MDM: CPT

## 2024-04-28 PROCEDURE — 85025 COMPLETE CBC W/AUTO DIFF WBC: CPT

## 2024-04-28 RX ORDER — MAGNESIUM SULFATE HEPTAHYDRATE 40 MG/ML
2 INJECTION, SOLUTION INTRAVENOUS ONCE
Status: COMPLETED | OUTPATIENT
Start: 2024-04-28 | End: 2024-04-29

## 2024-04-28 RX ORDER — POTASSIUM CHLORIDE 14.9 MG/ML
20 INJECTION INTRAVENOUS ONCE
Status: COMPLETED | OUTPATIENT
Start: 2024-04-28 | End: 2024-04-29

## 2024-04-28 RX ORDER — SUCRALFATE 1 G/1
1 TABLET ORAL ONCE
Status: COMPLETED | OUTPATIENT
Start: 2024-04-28 | End: 2024-04-28

## 2024-04-28 RX ORDER — SODIUM CHLORIDE, SODIUM GLUCONATE, SODIUM ACETATE, POTASSIUM CHLORIDE, MAGNESIUM CHLORIDE, SODIUM PHOSPHATE, DIBASIC, AND POTASSIUM PHOSPHATE .53; .5; .37; .037; .03; .012; .00082 G/100ML; G/100ML; G/100ML; G/100ML; G/100ML; G/100ML; G/100ML
1000 INJECTION, SOLUTION INTRAVENOUS ONCE
Status: COMPLETED | OUTPATIENT
Start: 2024-04-28 | End: 2024-04-28

## 2024-04-28 RX ORDER — ONDANSETRON 2 MG/ML
4 INJECTION INTRAMUSCULAR; INTRAVENOUS ONCE
Status: COMPLETED | OUTPATIENT
Start: 2024-04-28 | End: 2024-04-28

## 2024-04-28 RX ORDER — SUCRALFATE ORAL 1 G/10ML
1 SUSPENSION ORAL 4 TIMES DAILY
Qty: 414 ML | Refills: 0 | Status: SHIPPED | OUTPATIENT
Start: 2024-04-28

## 2024-04-28 RX ORDER — SUCRALFATE 1 G/1
1 TABLET ORAL ONCE
Status: DISCONTINUED | OUTPATIENT
Start: 2024-04-28 | End: 2024-04-28

## 2024-04-28 RX ORDER — LORAZEPAM 2 MG/ML
1 INJECTION INTRAMUSCULAR ONCE
Status: COMPLETED | OUTPATIENT
Start: 2024-04-28 | End: 2024-04-28

## 2024-04-28 RX ORDER — PANTOPRAZOLE SODIUM 40 MG/10ML
40 INJECTION, POWDER, LYOPHILIZED, FOR SOLUTION INTRAVENOUS ONCE
Status: COMPLETED | OUTPATIENT
Start: 2024-04-28 | End: 2024-04-28

## 2024-04-28 RX ADMIN — ONDANSETRON 4 MG: 2 INJECTION INTRAMUSCULAR; INTRAVENOUS at 19:54

## 2024-04-28 RX ADMIN — SODIUM CHLORIDE, SODIUM GLUCONATE, SODIUM ACETATE, POTASSIUM CHLORIDE, MAGNESIUM CHLORIDE, SODIUM PHOSPHATE, DIBASIC, AND POTASSIUM PHOSPHATE 1000 ML: .53; .5; .37; .037; .03; .012; .00082 INJECTION, SOLUTION INTRAVENOUS at 19:57

## 2024-04-28 RX ADMIN — PANTOPRAZOLE SODIUM 40 MG: 40 INJECTION, POWDER, FOR SOLUTION INTRAVENOUS at 19:54

## 2024-04-28 RX ADMIN — MAGNESIUM SULFATE HEPTAHYDRATE 2 G: 40 INJECTION, SOLUTION INTRAVENOUS at 21:24

## 2024-04-28 RX ADMIN — LORAZEPAM 1 MG: 2 INJECTION INTRAMUSCULAR; INTRAVENOUS at 19:53

## 2024-04-28 RX ADMIN — POTASSIUM CHLORIDE 20 MEQ: 14.9 INJECTION, SOLUTION INTRAVENOUS at 21:24

## 2024-04-28 RX ADMIN — SUCRALFATE 1 G: 1 TABLET ORAL at 23:30

## 2024-04-29 NOTE — ED ATTENDING ATTESTATION
4/28/2024  I, Radha Mcgraw MD, saw and evaluated the patient. I have discussed the patient with the resident/non-physician practitioner and agree with the resident's/non-physician practitioner's findings, Plan of Care, and MDM as documented in the resident's/non-physician practitioner's note, except where noted. All available labs and Radiology studies were reviewed.  I was present for key portions of any procedure(s) performed by the resident/non-physician practitioner and I was immediately available to provide assistance.       At this point I agree with the current assessment done in the Emergency Department.  I have conducted an independent evaluation of this patient a history and physical is as follows:  This is a 23-year-old woman with history of GERD here with vomiting.  Patient has history of recurrent reflux and is on Prilosec twice a day.  She states that for the last day or so she has been having difficulty tolerating p.o. and has nausea, globus sensation, and vomiting.  She denies abdominal pain.  She does not have diarrhea.  The patient has been on a puréed diet for the last 1 year because she states that she cannot swallow solids.  Patient has been having nonbloody nonbilious vomiting, and states that she cannot take her medications.  Patient uses marijuana, but only occasionally with last use 3 days ago.  She has no fevers or chills.  She has no urinary symptoms.  She does not believe she is pregnant, last period a week ago.  Review of systems otherwise -12 systems reviewed.  On exam patient is anxious.  Vital signs were reviewed.  Patient is awake, alert, interactive.  The patient's pupils are equally round reactive to light.  Oropharynx is clear with moist mucous membranes.  Neck is supple and nontender with no adenopathy or JVD.  Heart is regular with no murmurs, rubs, or gallops.  Lungs are clear and equal with no wheezes, rales, or rhonchi.  Abdomen is soft and nontender with no masses,  rebound, or guarding. There is no CVA tenderness.  The patient was completely exposed.  There is no skin breakdown.  There are no rashes or skin changes.  Extremities are warm and well perfused with good pulses. The patient has normal strength, sensation, and cranial nerves.MEDICAL DECISION MAKING    Number and Complexity of Problems  Differential diagnosis: Dehydration, intractable vomiting, reflux, anxiety, bulimia, hypokalemia    Medical Decision Making Data  External documents reviewed: Patient's endoscopic records reviewed, patient with no H. pylori, Allen's esophagus, or eosinophilic esophagitis  My EKG interpretation:   My CT interpretation:   My X-ray interpretation:   My ultrasound interpretation:     No orders to display       Labs Reviewed   COMPREHENSIVE METABOLIC PANEL - Abnormal       Result Value Ref Range Status    Sodium 138  135 - 147 mmol/L Final    Potassium 3.3 (*) 3.5 - 5.3 mmol/L Final    Chloride 107  96 - 108 mmol/L Final    CO2 19 (*) 21 - 32 mmol/L Final    ANION GAP 12  4 - 13 mmol/L Final    BUN 6  5 - 25 mg/dL Final    Creatinine 0.51 (*) 0.60 - 1.30 mg/dL Final    Comment: Standardized to IDMS reference method    Glucose 92  65 - 140 mg/dL Final    Comment: If the patient is fasting, the ADA then defines impaired fasting glucose as > 100 mg/dL and diabetes as > or equal to 123 mg/dL.    Calcium 8.7  8.4 - 10.2 mg/dL Final    AST 19  13 - 39 U/L Final    ALT 15  7 - 52 U/L Final    Comment: Specimen collection should occur prior to Sulfasalazine administration due to the potential for falsely depressed results.     Alkaline Phosphatase 60  34 - 104 U/L Final    Total Protein 6.9  6.4 - 8.4 g/dL Final    Albumin 4.1  3.5 - 5.0 g/dL Final    Total Bilirubin 0.50  0.20 - 1.00 mg/dL Final    Comment: Use of this assay is not recommended for patients undergoing treatment with eltrombopag due to the potential for falsely elevated results.  N-acetyl-p-benzoquinone imine (metabolite of  Acetaminophen) will generate erroneously low results in samples for patients that have taken an overdose of Acetaminophen.    eGFR 137  ml/min/1.73sq m Final    Narrative:     National Kidney Disease Foundation guidelines for Chronic Kidney Disease (CKD):     Stage 1 with normal or high GFR (GFR > 90 mL/min/1.73 square meters)    Stage 2 Mild CKD (GFR = 60-89 mL/min/1.73 square meters)    Stage 3A Moderate CKD (GFR = 45-59 mL/min/1.73 square meters)    Stage 3B Moderate CKD (GFR = 30-44 mL/min/1.73 square meters)    Stage 4 Severe CKD (GFR = 15-29 mL/min/1.73 square meters)    Stage 5 End Stage CKD (GFR <15 mL/min/1.73 square meters)  Note: GFR calculation is accurate only with a steady state creatinine   MAGNESIUM - Abnormal    Magnesium 1.8 (*) 1.9 - 2.7 mg/dL Final   CBC AND DIFFERENTIAL    WBC 6.87  4.31 - 10.16 Thousand/uL Final    RBC 4.40  3.81 - 5.12 Million/uL Final    Hemoglobin 12.9  11.5 - 15.4 g/dL Final    Hematocrit 37.2  34.8 - 46.1 % Final    MCV 85  82 - 98 fL Final    MCH 29.3  26.8 - 34.3 pg Final    MCHC 34.7  31.4 - 37.4 g/dL Final    RDW 12.2  11.6 - 15.1 % Final    MPV 11.5  8.9 - 12.7 fL Final    Platelets 238  149 - 390 Thousands/uL Final    nRBC 0  /100 WBCs Final    Segmented % 74  43 - 75 % Final    Immature Grans % 0  0 - 2 % Final    Lymphocytes % 19  14 - 44 % Final    Monocytes % 6  4 - 12 % Final    Eosinophils Relative 0  0 - 6 % Final    Basophils Relative 1  0 - 1 % Final    Absolute Neutrophils 5.08  1.85 - 7.62 Thousands/µL Final    Absolute Immature Grans 0.01  0.00 - 0.20 Thousand/uL Final    Absolute Lymphocytes 1.29  0.60 - 4.47 Thousands/µL Final    Absolute Monocytes 0.40  0.17 - 1.22 Thousand/µL Final    Eosinophils Absolute 0.03  0.00 - 0.61 Thousand/µL Final    Basophils Absolute 0.06  0.00 - 0.10 Thousands/µL Final       Labs reviewed by me are significant for: Hypomagnesemia, low bicarb    Clinical decision rules/scores are significant for:     Discussed case with:    Considered admission for:     Treatment and Disposition  ED course: Seen and examined, IV fluids, symptomatic care.  Reassessment: Patient is feeling somewhat better.  Will plan to discharge with diagnosis of 1 hypomagnesemia, 2 dehydration, 3 vomiting, for GERD  Shared decision making:   Code status:     ED Course         Critical Care Time  Procedures

## 2024-04-29 NOTE — ED PROVIDER NOTES
History  Chief Complaint   Patient presents with    Vomiting     Patient reports vomiting since last night. Patient is on a puree diet due to GERD.      Patient is a 23-year-old female with history of anxiety, GERD, and chronic low sensation who presents for vomiting.  Patient states that she has had severe GERD with globus sensation for about a year.  She had an evaluation from GI about a month ago and endoscopy which revealed sliding hiatal hernia with evidence of GERD but no evidence of other abnormalities of the esophagus.  Patient started herself on a puréed diet because she was having trouble tolerating solids.  She states that for the past 2 days her globus sensation has worsened and she now cannot tolerate liquids.  She says that today she had 2 episodes of nonbilious nonbloody vomiting.  Today she says she started to feel lightheaded and dizzy.  She denies any fevers, chills, abdominal pain, dysuria, hematuria, diarrhea, constipation.        Prior to Admission Medications   Prescriptions Last Dose Informant Patient Reported? Taking?   Acetaminophen (TYLENOL DISSOLVE PACKS PO)  Self Yes No   Sig: Take by mouth   diphenhydrAMINE (BENADRYL) 25 mg capsule   Yes No   Sig: Take 25 mg by mouth every 6 (six) hours as needed for itching   omeprazole (PriLOSEC) 20 mg delayed release capsule   No No   Sig: Take 1 capsule (20 mg total) by mouth 2 (two) times a day before meals      Facility-Administered Medications: None       Past Medical History:   Diagnosis Date    Migraine     Urinary tract infection     at age 8 or 10       No past surgical history on file.    Family History   Problem Relation Age of Onset    Cancer Mother     No Known Problems Father     Heart disease Maternal Grandmother     Diabetes Maternal Grandmother     Cancer Maternal Grandfather      I have reviewed and agree with the history as documented.    E-Cigarette/Vaping    E-Cigarette Use Current Every Day User      E-Cigarette/Vaping Substances     Nicotine Yes     THC Yes      Social History     Tobacco Use    Smoking status: Never    Smokeless tobacco: Never   Vaping Use    Vaping status: Every Day    Substances: Nicotine, THC   Substance Use Topics    Alcohol use: Yes     Comment: rarely    Drug use: No        Review of Systems   Constitutional:  Negative for chills and fever.   HENT:  Positive for trouble swallowing. Negative for congestion, rhinorrhea and sore throat.    Eyes:  Negative for pain and visual disturbance.   Respiratory:  Negative for cough and shortness of breath.    Cardiovascular:  Negative for chest pain and palpitations.   Gastrointestinal:  Positive for vomiting. Negative for abdominal pain, constipation, diarrhea and nausea.   Genitourinary:  Negative for dysuria and hematuria.   Musculoskeletal:  Negative for back pain and neck pain.   Skin:  Negative for color change and rash.   Neurological:  Positive for light-headedness. Negative for weakness and numbness.   All other systems reviewed and are negative.      Physical Exam  ED Triage Vitals   Temperature Pulse Respirations Blood Pressure SpO2   04/28/24 1827 04/28/24 1827 04/28/24 1827 04/28/24 1827 04/28/24 1827   98.5 °F (36.9 °C) 79 18 130/77 97 %      Temp Source Heart Rate Source Patient Position - Orthostatic VS BP Location FiO2 (%)   04/28/24 1827 -- 04/28/24 1827 04/28/24 1827 --   Temporal  Sitting Left arm       Pain Score       04/28/24 2142       No Pain             Orthostatic Vital Signs  Vitals:    04/28/24 1827 04/28/24 2142   BP: 130/77 126/70   Pulse: 79 88   Patient Position - Orthostatic VS: Sitting Lying       Physical Exam  Vitals and nursing note reviewed.   Constitutional:       General: She is not in acute distress.     Appearance: Normal appearance. She is well-developed and normal weight. She is not ill-appearing, toxic-appearing or diaphoretic.   HENT:      Head: Normocephalic and atraumatic.      Right Ear: External ear normal.      Left Ear: External  ear normal.      Nose: Nose normal. No congestion or rhinorrhea.      Mouth/Throat:      Mouth: Mucous membranes are moist.      Pharynx: Oropharynx is clear. No oropharyngeal exudate or posterior oropharyngeal erythema.      Comments: Poor dentition  Eyes:      General: No scleral icterus.        Right eye: No discharge.         Left eye: No discharge.      Extraocular Movements: Extraocular movements intact.      Conjunctiva/sclera: Conjunctivae normal.      Pupils: Pupils are equal, round, and reactive to light.   Cardiovascular:      Rate and Rhythm: Normal rate and regular rhythm.      Pulses: Normal pulses.      Heart sounds: Normal heart sounds. No murmur heard.     No friction rub. No gallop.   Pulmonary:      Effort: Pulmonary effort is normal. No respiratory distress.      Breath sounds: Normal breath sounds. No stridor. No wheezing, rhonchi or rales.   Abdominal:      General: Abdomen is flat. Bowel sounds are normal. There is no distension.      Palpations: Abdomen is soft.      Tenderness: There is no abdominal tenderness. There is no guarding or rebound.   Musculoskeletal:         General: No tenderness.      Cervical back: Neck supple.      Right lower leg: No edema.      Left lower leg: No edema.   Skin:     General: Skin is warm and dry.      Capillary Refill: Capillary refill takes less than 2 seconds.      Coloration: Skin is not jaundiced or pale.   Neurological:      General: No focal deficit present.      Mental Status: She is alert and oriented to person, place, and time.      Cranial Nerves: No cranial nerve deficit.      Sensory: No sensory deficit.      Motor: No weakness.   Psychiatric:         Mood and Affect: Mood normal.         Behavior: Behavior normal.         ED Medications  Medications   multi-electrolyte (ISOLYTE-S PH 7.4) bolus 1,000 mL (0 mL Intravenous Stopped 4/28/24 2122)   ondansetron (ZOFRAN) injection 4 mg (4 mg Intravenous Given 4/28/24 1954)   pantoprazole (PROTONIX)  injection 40 mg (40 mg Intravenous Given 4/28/24 1954)   LORazepam (ATIVAN) injection 1 mg (1 mg Intravenous Given 4/28/24 1953)   potassium chloride 20 mEq IVPB (premix) (0 mEq Intravenous Stopped 4/29/24 0001)   magnesium sulfate 2 g/50 mL IVPB (premix) 2 g (0 g Intravenous Stopped 4/29/24 0001)   sucralfate (CARAFATE) tablet 1 g (1 g Oral Given 4/28/24 2330)       Diagnostic Studies  Results Reviewed       Procedure Component Value Units Date/Time    Comprehensive metabolic panel [[id number]]  (Abnormal) Collected: 04/28/24 1958    Lab Status: Final result Specimen: Blood from Arm, Right Updated: 04/28/24 2026     Sodium 138 mmol/L      Potassium 3.3 mmol/L      Chloride 107 mmol/L      CO2 19 mmol/L      ANION GAP 12 mmol/L      BUN 6 mg/dL      Creatinine 0.51 mg/dL      Glucose 92 mg/dL      Calcium 8.7 mg/dL      AST 19 U/L      ALT 15 U/L      Alkaline Phosphatase 60 U/L      Total Protein 6.9 g/dL      Albumin 4.1 g/dL      Total Bilirubin 0.50 mg/dL      eGFR 137 ml/min/1.73sq m     Narrative:      National Kidney Disease Foundation guidelines for Chronic Kidney Disease (CKD):     Stage 1 with normal or high GFR (GFR > 90 mL/min/1.73 square meters)    Stage 2 Mild CKD (GFR = 60-89 mL/min/1.73 square meters)    Stage 3A Moderate CKD (GFR = 45-59 mL/min/1.73 square meters)    Stage 3B Moderate CKD (GFR = 30-44 mL/min/1.73 square meters)    Stage 4 Severe CKD (GFR = 15-29 mL/min/1.73 square meters)    Stage 5 End Stage CKD (GFR <15 mL/min/1.73 square meters)  Note: GFR calculation is accurate only with a steady state creatinine    Magnesium [[id number]]  (Abnormal) Collected: 04/28/24 1958    Lab Status: Final result Specimen: Blood from Arm, Right Updated: 04/28/24 2026     Magnesium 1.8 mg/dL     CBC and differential [[id number]] Collected: 04/28/24 1958    Lab Status: Final result Specimen: Blood from Arm, Right Updated: 04/28/24 2005     WBC 6.87 Thousand/uL      RBC 4.40 Million/uL      Hemoglobin 12.9 g/dL       Hematocrit 37.2 %      MCV 85 fL      MCH 29.3 pg      MCHC 34.7 g/dL      RDW 12.2 %      MPV 11.5 fL      Platelets 238 Thousands/uL      nRBC 0 /100 WBCs      Segmented % 74 %      Immature Grans % 0 %      Lymphocytes % 19 %      Monocytes % 6 %      Eosinophils Relative 0 %      Basophils Relative 1 %      Absolute Neutrophils 5.08 Thousands/µL      Absolute Immature Grans 0.01 Thousand/uL      Absolute Lymphocytes 1.29 Thousands/µL      Absolute Monocytes 0.40 Thousand/µL      Eosinophils Absolute 0.03 Thousand/µL      Basophils Absolute 0.06 Thousands/µL                    No orders to display         Procedures  Procedures      ED Course                                       Medical Decision Making  Patient is a 23-year-old female with history of anxiety, GERD, and chronic globus sensation who presents with vomiting.    DDx includes but not limited to GERD, electrolyte abnormality, anemia, bulimia.  Patient presents with normal vital signs and overall reassuring exam but does have poor dentition and signs of possible dehydration.  Will plan to obtain CBC, CMP, magnesium.  Will treat patient's symptoms with GI cocktail, fluids, Zofran, Protonix, and Ativan.    Patient's labs concerning for hypokalemia and hypomagnesemia, will replete.  Patient is tolerating p.o. water after medications.  I advised patient that she needs to follow-up with GI and her family doctor to discuss further management of her globus sensation.  I suspect this might be related to anxiety.  Patient was signed out to Dr. Brown with plans to discharge following completion of her IV electrolyte replacement.    Amount and/or Complexity of Data Reviewed  Labs: ordered.    Risk  Prescription drug management.          Disposition  Final diagnoses:   Nausea and vomiting   Hypokalemia   Hypomagnesemia     Time reflects when diagnosis was documented in both MDM as applicable and the Disposition within this note       Time User Action  Codes Description Comment    4/28/2024 10:01 PM Robby Reed Add [R11.2] Nausea and vomiting     4/28/2024 10:01 PM Robby Reed Add [E87.6] Hypokalemia     4/28/2024 10:01 PM Robby Reed Add [E83.42] Hypomagnesemia           ED Disposition       ED Disposition   Discharge    Condition   Stable    Date/Time   Sun Apr 28, 2024 10:01 PM    Comment   Karen Napier discharge to home/self care.                   Follow-up Information       Follow up With Specialties Details Why Contact Info Additional Information    [facility] Emergency Department Emergency Medicine Go to  If symptoms worsen or if you have any other specific concerns [address] [facility] [address] [phone] [facility] Emergency Department, [address], [place], [id number] [phone]    [facility] Medicine Call today To make appointment for reevaluation if you do not have a PCP [address] [facility] [address]  [phone] [facility] [address], [place], [address] [phone]            Discharge Medication List as of 4/28/2024 10:03 PM        START taking these medications    Details   sucralfate (CARAFATE) 1 g/10 mL suspension Take 10 mL (1 g total) by mouth 4 (four) times a day, Starting Sun 4/28/2024, Normal           CONTINUE these medications which have NOT CHANGED    Details   Acetaminophen (TYLENOL DISSOLVE PACKS PO) Take by mouth, Historical Med      diphenhydrAMINE (BENADRYL) 25 mg capsule Take 25 mg by mouth every 6 (six) hours as needed for itching, Historical Med      omeprazole (PriLOSEC) 20 mg delayed release capsule Take 1 capsule (20 mg total) by mouth 2 (two) times a day before meals, Starting Mon 7/31/2023, Until Sun 1/7/2024, Normal           No discharge procedures on file.    PDMP Review       None              ED Provider  Attending physically available and evaluated Karen Napier. I managed the patient along with the ED Attending.    Electronically Signed by           Robby Reed MD  04/29/24 2152

## 2024-04-29 NOTE — DISCHARGE INSTRUCTIONS
Please take Carafate as prescribed.  Please wait 2 hours after taking her Prilosec before taking the Carafate.    Please follow-up with your family doctor and GI regarding your symptoms.    Please return if you develop any new or concerning symptoms including worsening abdominal pain, severe vomiting, or if you cannot tolerate any food or liquids.

## 2024-05-09 ENCOUNTER — OFFICE VISIT (OUTPATIENT)
Age: 24
End: 2024-05-09
Payer: MEDICARE

## 2024-05-09 VITALS
HEART RATE: 123 BPM | SYSTOLIC BLOOD PRESSURE: 110 MMHG | HEIGHT: 64 IN | DIASTOLIC BLOOD PRESSURE: 60 MMHG | OXYGEN SATURATION: 99 % | BODY MASS INDEX: 18.27 KG/M2 | WEIGHT: 107 LBS | RESPIRATION RATE: 18 BRPM | TEMPERATURE: 98 F

## 2024-05-09 DIAGNOSIS — K21.9 CHRONIC GERD: ICD-10-CM

## 2024-05-09 DIAGNOSIS — Z76.89 ENCOUNTER FOR NAIL CARE: ICD-10-CM

## 2024-05-09 DIAGNOSIS — F43.23 SITUATIONAL MIXED ANXIETY AND DEPRESSIVE DISORDER: Primary | ICD-10-CM

## 2024-05-09 DIAGNOSIS — R13.10 ODYNOPHAGIA: ICD-10-CM

## 2024-05-09 PROCEDURE — 99213 OFFICE O/P EST LOW 20 MIN: CPT

## 2024-05-09 RX ORDER — MIRTAZAPINE 15 MG/1
15 TABLET, ORALLY DISINTEGRATING ORAL
Qty: 30 TABLET | Refills: 0 | Status: SHIPPED | OUTPATIENT
Start: 2024-05-09 | End: 2024-06-08

## 2024-05-09 NOTE — PROGRESS NOTES
Name: Karen Napier      : 2000      MRN: [id number]  Encounter Provider: Adele Reyes MD  Encounter Date: 2024   Encounter department: [facility]    Assessment & Plan     1. Situational mixed anxiety and depressive disorder  Comments:  GAD7: 11  PHQ9: 8  Recommend trial of mirtazapine for mood and appetitie  Orders:  -     mirtazapine (REMERON SOL-TAB) 15 mg disintegrating tablet; Take 1 tablet (15 mg total) by mouth daily at bedtime  -     Ambulatory referral to Psych Services; Future  2. Chronic GERD  Comments:  Continue PPI, sucralfate  Call GI office and schedule follow up appointment ASAP  3. Odynophagia  4. Encounter for nail care  -     Ambulatory Referral to Podiatry; Future     Return in about 1 month (around 2024) for Recheck mood.    Subjective      HPI  Patient presents for a follow-up visit following recent ED visit for GERD flare.   She has this prolonged history of anxiety as well as significant GI issues-- GERD, dysphagia/odynophagia and can only tolerate a pureed diet. She also has poor dentition, likely in the setting of her chronic GERD.  It is unclear if there is a history of an eating disorder like bulimia.    She was recently seen in the ED for a flare of her GI symptoms and was given a dose of Protonix and sucralfate. She was also given a dose of Ativan and reports having significant improvement of her symptoms following this medication.    The patient was seen by GI last summer with subsequent EGD performed, which was unremarkable.  She was advised to continue PPIs, nonulcerogenic diet and follow-up in several weeks. She has not been seen by them since then and the patient notes minimal improvement with the PPI and sucralfate.    The patient also acknowledges associated depression/anxiety and is agreeable to a trial of mood stabilizing meds.    PHQ-2/9 Depression Screening    Little interest or pleasure in doing things: 2 - more than half the  days  Feeling down, depressed, or hopeless: 1 - several days  Trouble falling or staying asleep, or sleeping too much: 2 - more than half the days  Feeling tired or having little energy: 1 - several days  Poor appetite or overeatin - more than half the days  Feeling bad about yourself - or that you are a failure or have let yourself or your family down: 0 - not at all  Trouble concentrating on things, such as reading the newspaper or watching television: 0 - not at all  Moving or speaking so slowly that other people could have noticed. Or the opposite - being so fidgety or restless that you have been moving around a lot more than usual: 0 - not at all  Thoughts that you would be better off dead, or of hurting yourself in some way: 0 - not at all  PHQ-2 Score: 3  PHQ-2 Interpretation: POSITIVE depression screen  PHQ-9 Score: 8  PHQ-9 Interpretation: Mild depression         EMANUEL-7 Flowsheet Screening      Flowsheet Row Most Recent Value   Over the last 2 weeks, how often have you been bothered by any of the following problems?    Feeling nervous, anxious, or on edge 1   Not being able to stop or control worrying 2   Worrying too much about different things 1   Trouble relaxing 3   Being so restless that it is hard to sit still 2   Becoming easily annoyed or irritable 1   Feeling afraid as if something awful might happen 1   EMANUEL-7 Total Score 11            Review of Systems   Constitutional:  Positive for appetite change (2/2 odynophagia/dysphagia) and unexpected weight change (due to poor PO intake). Negative for chills and fever.   HENT:  Positive for dental problem, sore throat and trouble swallowing. Negative for congestion, rhinorrhea, sinus pressure and sinus pain.    Eyes:  Negative for visual disturbance.   Respiratory:  Negative for cough, choking and shortness of breath.    Cardiovascular:  Negative for chest pain and palpitations.   Gastrointestinal:  Negative for abdominal pain, nausea and vomiting.  "  Genitourinary:  Negative for dysuria.   Musculoskeletal:  Negative for arthralgias.   Neurological:  Negative for dizziness and headaches.   Psychiatric/Behavioral:  Negative for confusion, self-injury, sleep disturbance and suicidal ideas.        Current Outpatient Medications on File Prior to Visit   Medication Sig    Acetaminophen (TYLENOL DISSOLVE PACKS PO) Take by mouth    diphenhydrAMINE (BENADRYL) 25 mg capsule Take 25 mg by mouth every 6 (six) hours as needed for itching    omeprazole (PriLOSEC) 20 mg delayed release capsule Take 1 capsule (20 mg total) by mouth 2 (two) times a day before meals    sucralfate (CARAFATE) 1 g/10 mL suspension Take 10 mL (1 g total) by mouth 4 (four) times a day       Objective     /60   Pulse (!) 123   Temp 98 °F (36.7 °C)   Resp 18   Ht 5' 4\" (1.626 m)   Wt 48.5 kg (107 lb)   SpO2 99%   BMI 18.37 kg/m²     Physical Exam  Constitutional:       General: She is not in acute distress.     Appearance: Normal appearance. She is underweight. She is not ill-appearing or toxic-appearing.   HENT:      Head: Atraumatic.      Right Ear: External ear normal.      Left Ear: External ear normal.      Nose: Nose normal.      Mouth/Throat:      Mouth: Mucous membranes are moist.      Pharynx: Oropharynx is clear.      Comments: Poor dentition was noted  Eyes:      General:         Right eye: No discharge.         Left eye: No discharge.      Extraocular Movements: Extraocular movements intact.      Conjunctiva/sclera: Conjunctivae normal.   Cardiovascular:      Rate and Rhythm: Regular rhythm. Tachycardia present.      Pulses: Normal pulses.      Heart sounds: Normal heart sounds.   Pulmonary:      Effort: Pulmonary effort is normal. No respiratory distress.      Breath sounds: Normal breath sounds.   Musculoskeletal:         General: Normal range of motion.      Cervical back: Normal range of motion and neck supple.   Skin:     General: Skin is warm and dry.   Neurological:     "  General: No focal deficit present.      Mental Status: She is alert and oriented to person, place, and time.   Psychiatric:         Mood and Affect: Mood normal.         Behavior: Behavior normal.       Adele Reyes MD

## 2024-05-14 ENCOUNTER — OFFICE VISIT (OUTPATIENT)
Dept: PODIATRY | Facility: CLINIC | Age: 24
End: 2024-05-14
Payer: MEDICARE

## 2024-05-14 VITALS
SYSTOLIC BLOOD PRESSURE: 116 MMHG | WEIGHT: 106 LBS | BODY MASS INDEX: 18.1 KG/M2 | HEIGHT: 64 IN | RESPIRATION RATE: 18 BRPM | DIASTOLIC BLOOD PRESSURE: 79 MMHG | HEART RATE: 88 BPM

## 2024-05-14 DIAGNOSIS — Z76.89 ENCOUNTER FOR NAIL CARE: ICD-10-CM

## 2024-05-14 DIAGNOSIS — L60.3 NAIL DYSTROPHY: Primary | ICD-10-CM

## 2024-05-14 PROCEDURE — 99243 OFF/OP CNSLTJ NEW/EST LOW 30: CPT | Performed by: PODIATRIST
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
## 2024-05-31 DIAGNOSIS — R11.2 NAUSEA AND VOMITING: ICD-10-CM

## 2024-05-31 DIAGNOSIS — R13.10 DIFFICULTY SWALLOWING SOLIDS: ICD-10-CM

## 2024-06-01 RX ORDER — OMEPRAZOLE 20 MG/1
20 CAPSULE, DELAYED RELEASE ORAL
Qty: 180 CAPSULE | Refills: 1 | Status: SHIPPED | OUTPATIENT
Start: 2024-06-01 | End: 2024-11-28

## 2024-06-03 RX ORDER — SUCRALFATE ORAL 1 G/10ML
1 SUSPENSION ORAL 4 TIMES DAILY
Qty: 414 ML | Refills: 0 | Status: SHIPPED | OUTPATIENT
Start: 2024-06-03

## 2024-06-11 ENCOUNTER — OFFICE VISIT (OUTPATIENT)
Age: 24
End: 2024-06-11
Payer: MEDICARE

## 2024-06-11 VITALS
HEIGHT: 64 IN | TEMPERATURE: 98 F | OXYGEN SATURATION: 99 % | WEIGHT: 109.6 LBS | BODY MASS INDEX: 18.71 KG/M2 | HEART RATE: 82 BPM | DIASTOLIC BLOOD PRESSURE: 70 MMHG | RESPIRATION RATE: 18 BRPM | SYSTOLIC BLOOD PRESSURE: 118 MMHG

## 2024-06-11 DIAGNOSIS — R13.10 ODYNOPHAGIA: ICD-10-CM

## 2024-06-11 DIAGNOSIS — K21.9 CHRONIC GERD: ICD-10-CM

## 2024-06-11 DIAGNOSIS — L60.3 NAIL DYSTROPHY: ICD-10-CM

## 2024-06-11 DIAGNOSIS — F43.23 SITUATIONAL MIXED ANXIETY AND DEPRESSIVE DISORDER: Primary | ICD-10-CM

## 2024-06-11 PROCEDURE — 99213 OFFICE O/P EST LOW 20 MIN: CPT

## 2024-06-11 RX ORDER — OMEPRAZOLE 20 MG/1
20 CAPSULE, DELAYED RELEASE ORAL
Qty: 180 CAPSULE | Refills: 0 | Status: SHIPPED | OUTPATIENT
Start: 2024-06-11 | End: 2024-09-09

## 2024-06-11 RX ORDER — SUCRALFATE ORAL 1 G/10ML
1 SUSPENSION ORAL
Qty: 3600 ML | Refills: 0 | Status: SHIPPED | OUTPATIENT
Start: 2024-06-11 | End: 2024-09-09

## 2024-06-11 NOTE — PROGRESS NOTES
Ambulatory Visit  Name: Karen Napier      : 2000      MRN: [id number]  Encounter Provider: Adele Reyes MD  Encounter Date: 2024   Encounter department: [facility]    Assessment & Plan   1. Situational mixed anxiety and depressive disorder  Comments:  Did not start mirtazapine  Consult appointment scheduled with psychiatry on   2. Chronic GERD  Comments:  Adequately controlled with PPI and sucralfate  Orders:  -     sucralfate (CARAFATE) 1 g/10 mL suspension; Take 10 mL (1 g total) by mouth 4 (four) times a day (with meals and at bedtime)  -     omeprazole (PriLOSEC) 20 mg delayed release capsule; Take 1 capsule (20 mg total) by mouth 2 (two) times a day before meals  3. Odynophagia  4. Nail dystrophy  Comments:  Evaluated by podiatry  Scheduled for procedure to remove affected toenails on      Nutrition Assessment and Intervention:     Reviewed and updated Nutrition Prescription      Other interventions: - Continue with low acidic diet    Emotional and Mental Well-being, Sleep, Connectedness Assessment and Intervention:    Sleep/stress assessment performed      Other interventions: - Patient will follow-up with psychiatry next week    Follow-up in 3 months for annual physical.    History of Present Illness     HPI  Patient presents for a follow up visit to recheck mood and GERD symptoms.  GERD symptoms and odynophagia remain the same and patient endorses symptomatic improvement with use of Carafate and PPI.  She is scheduled to follow-up with GI in September.     For mood disorder, the patient did not start taking mirtazapine that was prescribed during her last visit due to concerns of possible side effects.  She is scheduled to meet with psychiatry next week and plans to discuss further options with them.    She was seen by podiatry for abnormal toenail findings.  She was found to have nail dystrophy and is scheduled to undergo surgery for removal of the affected  toenails next week.    The patient is also applying for disability and plans to follow-up regarding this matter.    Review of Systems   Constitutional:  Positive for appetite change (2/2 odynophagia/dysphagia) and unexpected weight change (due to poor PO intake). Negative for chills and fever.   HENT:  Positive for dental problem, sore throat and trouble swallowing. Negative for congestion, rhinorrhea, sinus pressure and sinus pain.    Eyes:  Negative for visual disturbance.   Respiratory:  Negative for cough, choking and shortness of breath.    Cardiovascular:  Negative for chest pain and palpitations.   Gastrointestinal:  Negative for abdominal pain, nausea and vomiting.   Genitourinary:  Negative for dysuria.   Musculoskeletal:  Negative for arthralgias.   Neurological:  Negative for dizziness and headaches.   Psychiatric/Behavioral:  Positive for dysphoric mood. Negative for confusion, self-injury, sleep disturbance and suicidal ideas. The patient is nervous/anxious.      Past Medical History:   Diagnosis Date    Anxiety     Dental cavities     Dysphagia     GERD (gastroesophageal reflux disease)     Hiatal hernia with GERD     Sliding hiatial hernia    Migraine     Urinary tract infection     at age 8 or 10     Past Surgical History:   Procedure Laterality Date    EGD       Family History   Problem Relation Age of Onset    Cancer Mother     No Known Problems Father     Heart disease Maternal Grandmother     Diabetes Maternal Grandmother     Cancer Maternal Grandfather      Social History     Tobacco Use    Smoking status: Never    Smokeless tobacco: Never   Vaping Use    Vaping status: Every Day    Substances: Nicotine   Substance and Sexual Activity    Alcohol use: Yes     Comment: rarely    Drug use: No    Sexual activity: Yes     Birth control/protection: Implant     Current Outpatient Medications on File Prior to Visit   Medication Sig    Acetaminophen (TYLENOL DISSOLVE PACKS PO) Take by mouth     "diphenhydrAMINE (BENADRYL) 25 mg capsule Take 25 mg by mouth every 6 (six) hours as needed for itching    mirtazapine (REMERON SOL-TAB) 15 mg disintegrating tablet Take 1 tablet (15 mg total) by mouth daily at bedtime (Patient not taking: Reported on 6/11/2024)     Allergies   Allergen Reactions    Amoxicillin GI Intolerance and Vomiting     Immunization History   Administered Date(s) Administered    DTaP 5 2000, 04/17/2003, 06/23/2003, 04/28/2004, 03/17/2008    HPV Quadrivalent 10/04/2013    HPV9 04/07/2016    Hep A, adult 04/07/2016    Hep B / HiB 2000, 04/17/2003, 12/03/2003    IPV 2000, 04/17/2003, 04/28/2004, 03/17/2008    MMR 12/03/2003, 03/17/2008    Meningococcal B, Recombinant (TRUMENBA) 09/27/2018    Meningococcal MCV4, Unspecified 10/04/2013    Meningococcal MCV4P 10/04/2013, 09/27/2018    Meningococcal, Unknown Serogroups 10/04/2013    Pneumococcal Conjugate PCV 7 2002, 04/17/2003, 06/23/2003, 12/03/2003    Tdap 10/04/2013    Varicella 12/03/2003, 03/17/2008     Objective     /70   Pulse 82   Temp 98 °F (36.7 °C)   Resp 18   Ht 5' 4\" (1.626 m)   Wt 49.7 kg (109 lb 9.6 oz)   LMP 06/12/2024   SpO2 99%   BMI 18.81 kg/m²     Physical Exam  Vitals and nursing note reviewed.   Constitutional:       General: She is not in acute distress.     Appearance: Normal appearance. She is normal weight. She is not ill-appearing, toxic-appearing or diaphoretic.   HENT:      Head: Atraumatic.      Mouth/Throat:      Mouth: Mucous membranes are moist.      Dentition: Abnormal dentition.      Pharynx: Oropharynx is clear.   Eyes:      Extraocular Movements: Extraocular movements intact.      Conjunctiva/sclera: Conjunctivae normal.   Cardiovascular:      Rate and Rhythm: Normal rate and regular rhythm.      Pulses: Normal pulses.      Heart sounds: Normal heart sounds.   Pulmonary:      Effort: Pulmonary effort is normal. No respiratory distress.      Breath sounds: Normal breath sounds. "   Musculoskeletal:         General: Normal range of motion.      Cervical back: Neck supple.   Skin:     General: Skin is warm and dry.   Neurological:      Mental Status: She is alert and oriented to person, place, and time.      Motor: No weakness.   Psychiatric:         Behavior: Behavior normal.       Administrative Statements

## 2024-06-13 ENCOUNTER — OFFICE VISIT (OUTPATIENT)
Dept: URGENT CARE | Age: 24
End: 2024-06-13
Payer: MEDICARE

## 2024-06-13 VITALS
WEIGHT: 109 LBS | HEART RATE: 70 BPM | HEIGHT: 64 IN | BODY MASS INDEX: 18.61 KG/M2 | TEMPERATURE: 97 F | SYSTOLIC BLOOD PRESSURE: 127 MMHG | DIASTOLIC BLOOD PRESSURE: 67 MMHG | OXYGEN SATURATION: 98 % | RESPIRATION RATE: 20 BRPM

## 2024-06-13 DIAGNOSIS — K04.7 DENTAL ABSCESS: Primary | ICD-10-CM

## 2024-06-13 PROCEDURE — 99213 OFFICE O/P EST LOW 20 MIN: CPT | Performed by: PREVENTIVE MEDICINE

## 2024-06-13 RX ORDER — CLINDAMYCIN PALMITATE HYDROCHLORIDE 75 MG/5ML
300 SOLUTION ORAL 3 TIMES DAILY
Qty: 420 ML | Refills: 0 | Status: SHIPPED | OUTPATIENT
Start: 2024-06-13 | End: 2024-06-20

## 2024-06-13 RX ORDER — CLINDAMYCIN HYDROCHLORIDE 300 MG/1
300 CAPSULE ORAL 3 TIMES DAILY
Qty: 21 CAPSULE | Refills: 0 | Status: SHIPPED | OUTPATIENT
Start: 2024-06-13 | End: 2024-06-13 | Stop reason: CLARIF

## 2024-06-13 NOTE — PROGRESS NOTES
[facility]        NAME: Karen Napier is a 23 y.o. female  : 2000    MRN: [id number]  DATE: 2024  TIME: 4:34 PM    Assessment and Plan   Dental abscess [K04.7]  1. Dental abscess  clindamycin (CLEOCIN) 300 MG capsule      Follow up with dentist for further evaluation and treatment.  Patient Instructions     Follow up with PCP in 3-5 days.  Proceed to  ER if symptoms worsen.    If tests have been performed at [facility], our office will contact you with results if changes need to be made to the care plan discussed with you at the visit.  You can review your full results on [facility].    Chief Complaint     Chief Complaint   Patient presents with    Oral Pain     Having pain in the upper gum line. She has appt to get her implants starting. She states she cannot get antibiotics from dental office due to having to pay without insurance. Denies fevers or chills.   Pain began two days ago.           History of Present Illness       Pt is a 23-year-old female presenting complaining of a dental abscess of her upper two front teeth. She states she has a significant history of abscesses and dental decay. She is getting implants this summer. Denies any other symptoms at this time.         Review of Systems   Review of Systems   Constitutional:  Negative for chills and fever.   HENT:  Positive for dental problem.    Respiratory:  Negative for shortness of breath, wheezing and stridor.    Gastrointestinal:  Negative for nausea and vomiting.   Genitourinary: Negative.    Musculoskeletal: Negative.    Skin: Negative.    Neurological: Negative.          Current Medications       Current Outpatient Medications:     clindamycin (CLEOCIN) 300 MG capsule, Take 1 capsule (300 mg total) by mouth 3 (three) times a day for 7 days, Disp: 21 capsule, Rfl: 0    diphenhydrAMINE (BENADRYL) 25 mg capsule, Take 25 mg by mouth every 6 (six) hours as needed for itching, Disp: , Rfl:     omeprazole (PriLOSEC) 20  "mg delayed release capsule, Take 1 capsule (20 mg total) by mouth 2 (two) times a day before meals, Disp: 180 capsule, Rfl: 0    sucralfate (CARAFATE) 1 g/10 mL suspension, Take 10 mL (1 g total) by mouth 4 (four) times a day (with meals and at bedtime), Disp: 3600 mL, Rfl: 0    Acetaminophen (TYLENOL DISSOLVE PACKS PO), Take by mouth, Disp: , Rfl:     mirtazapine (REMERON SOL-TAB) 15 mg disintegrating tablet, Take 1 tablet (15 mg total) by mouth daily at bedtime (Patient not taking: Reported on 6/11/2024), Disp: 30 tablet, Rfl: 0    Current Allergies     Allergies as of 06/13/2024 - Reviewed 06/13/2024   Allergen Reaction Noted    Amoxicillin GI Intolerance and Vomiting 01/30/2022            The following portions of the patient's history were reviewed and updated as appropriate: allergies, current medications, past family history, past medical history, past social history, past surgical history and problem list.     Past Medical History:   Diagnosis Date    Anxiety     Dental cavities     Dysphagia     GERD (gastroesophageal reflux disease)     Hiatal hernia with GERD     Sliding hiatial hernia    Migraine     Urinary tract infection     at age 8 or 10       Past Surgical History:   Procedure Laterality Date    EGD         Family History   Problem Relation Age of Onset    Cancer Mother     No Known Problems Father     Heart disease Maternal Grandmother     Diabetes Maternal Grandmother     Cancer Maternal Grandfather          Medications have been verified.        Objective   /67   Pulse 70   Temp (!) 97 °F (36.1 °C)   Resp 20   Ht 5' 4\" (1.626 m)   Wt 49.4 kg (109 lb)   LMP 06/12/2024   SpO2 98%   BMI 18.71 kg/m²   Patient's last menstrual period was 06/12/2024.       Physical Exam     Physical Exam  Vitals and nursing note reviewed.   Constitutional:       General: She is not in acute distress.     Appearance: Normal appearance. She is normal weight. She is not ill-appearing, toxic-appearing or " diaphoretic.   HENT:      Head: Normocephalic and atraumatic.      Right Ear: External ear normal.      Left Ear: Tympanic membrane and external ear normal.      Nose: No congestion or rhinorrhea.      Mouth/Throat:      Mouth: Mucous membranes are moist.      Dentition: Abnormal dentition. Dental tenderness, dental caries and dental abscesses present.      Pharynx: Uvula midline. No oropharyngeal exudate or posterior oropharyngeal erythema.     Eyes:      General: No scleral icterus.        Right eye: No discharge.         Left eye: No discharge.      Conjunctiva/sclera: Conjunctivae normal.   Cardiovascular:      Rate and Rhythm: Normal rate and regular rhythm.      Pulses: Normal pulses.   Pulmonary:      Effort: Pulmonary effort is normal. No respiratory distress.   Musculoskeletal:         General: Normal range of motion.      Cervical back: Normal range of motion and neck supple. No rigidity or tenderness.   Lymphadenopathy:      Cervical: No cervical adenopathy.   Skin:     General: Skin is warm and dry.      Capillary Refill: Capillary refill takes less than 2 seconds.      Coloration: Skin is not jaundiced.      Findings: No bruising or rash.   Neurological:      General: No focal deficit present.      Mental Status: She is alert. Mental status is at baseline.   Psychiatric:         Mood and Affect: Mood normal.         Behavior: Behavior normal.

## 2024-06-19 ENCOUNTER — TELEPHONE (OUTPATIENT)
Dept: PODIATRY | Facility: CLINIC | Age: 24
End: 2024-06-19

## 2024-06-19 NOTE — TELEPHONE ENCOUNTER
Caller: Karen Napier    Doctor and/or Office: Richar Whitmore DPM    #: [phone]    Escalation: Karen is scheduled for surgery on 6/21/24.  She started taking Clindamycin for a tooth abscess.  Karen was told to call the office if she started any new medications.  Please call Karen if this will affect her surgery.

## 2024-06-20 ENCOUNTER — TELEPHONE (OUTPATIENT)
Age: 24
End: 2024-06-20

## 2024-06-20 NOTE — TELEPHONE ENCOUNTER
Caller: [facility] - Pre admission testing department      Doctor: Myranda     Reason for call: Asking for consent to be scanned into patient's chart for surgery tomorrow      Call back#: [phone]

## 2024-06-24 ENCOUNTER — TELEPHONE (OUTPATIENT)
Dept: PSYCHIATRY | Facility: CLINIC | Age: 24
End: 2024-06-24

## 2024-06-24 ENCOUNTER — OFFICE VISIT (OUTPATIENT)
Dept: BEHAVIORAL/MENTAL HEALTH CLINIC | Facility: CLINIC | Age: 24
End: 2024-06-24

## 2024-06-24 PROCEDURE — NC001 PR NO CHARGE

## 2024-06-24 NOTE — TELEPHONE ENCOUNTER
----- Message from Stephane Blankenship DO sent at 6/24/2024  1:36 PM EDT -----  Regarding: Therapy referral  Hello,    Would like to refer this patient for trauma informed therapy. Patient is higher than routine referral level due to physical symptoms causing a decrease and now borderline BMI.     Ccing patient pcp    Thanks,  Dr. Blankenship
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
## 2024-06-25 ENCOUNTER — PREP FOR PROCEDURE (OUTPATIENT)
Dept: PODIATRY | Facility: CLINIC | Age: 24
End: 2024-06-25

## 2024-06-25 DIAGNOSIS — L60.3 NAIL DYSTROPHY: Primary | ICD-10-CM

## 2024-06-25 RX ORDER — CEFAZOLIN SODIUM 1 G/50ML
1000 SOLUTION INTRAVENOUS ONCE
Status: CANCELLED | OUTPATIENT
Start: 2024-06-28 | End: 2024-06-25

## 2024-06-26 ENCOUNTER — ANESTHESIA EVENT (OUTPATIENT)
Dept: PERIOP | Facility: HOSPITAL | Age: 24
End: 2024-06-26
Payer: MEDICARE

## 2024-06-28 ENCOUNTER — HOSPITAL ENCOUNTER (OUTPATIENT)
Facility: HOSPITAL | Age: 24
Setting detail: OUTPATIENT SURGERY
Discharge: HOME | End: 2024-06-28
Attending: PODIATRIST | Admitting: PODIATRIST
Payer: MEDICARE

## 2024-06-28 ENCOUNTER — TELEPHONE (OUTPATIENT)
Age: 24
End: 2024-06-28

## 2024-06-28 ENCOUNTER — ANESTHESIA (OUTPATIENT)
Dept: PERIOP | Facility: HOSPITAL | Age: 24
End: 2024-06-28
Payer: MEDICARE

## 2024-06-28 VITALS
BODY MASS INDEX: 18.61 KG/M2 | HEART RATE: 77 BPM | RESPIRATION RATE: 18 BRPM | WEIGHT: 109 LBS | TEMPERATURE: 97.3 F | OXYGEN SATURATION: 100 % | DIASTOLIC BLOOD PRESSURE: 81 MMHG | HEIGHT: 64 IN | SYSTOLIC BLOOD PRESSURE: 129 MMHG

## 2024-06-28 DIAGNOSIS — G89.18 POST-OP PAIN: Primary | ICD-10-CM

## 2024-06-28 LAB
EXT PREGNANCY TEST URINE: NEGATIVE
EXT. CONTROL: NORMAL

## 2024-06-28 PROCEDURE — NC001 PR NO CHARGE: Performed by: STUDENT IN AN ORGANIZED HEALTH CARE EDUCATION/TRAINING PROGRAM

## 2024-06-28 PROCEDURE — C9113 INJ PANTOPRAZOLE SODIUM, VIA: HCPCS | Performed by: STUDENT IN AN ORGANIZED HEALTH CARE EDUCATION/TRAINING PROGRAM

## 2024-06-28 PROCEDURE — 11732 AVLSN NAIL PLATE SIMPLE EACH: CPT | Performed by: PODIATRIST

## 2024-06-28 PROCEDURE — 99024 POSTOP FOLLOW-UP VISIT: CPT | Performed by: PODIATRIST

## 2024-06-28 PROCEDURE — 11730 AVULSION NAIL PLATE SIMPLE 1: CPT | Performed by: PODIATRIST

## 2024-06-28 PROCEDURE — 81025 URINE PREGNANCY TEST: CPT | Performed by: PODIATRIST

## 2024-06-28 RX ORDER — FENTANYL CITRATE/PF 50 MCG/ML
25 SYRINGE (ML) INJECTION
Status: DISCONTINUED | OUTPATIENT
Start: 2024-06-28 | End: 2024-06-28 | Stop reason: HOSPADM

## 2024-06-28 RX ORDER — LIDOCAINE HYDROCHLORIDE AND EPINEPHRINE 10; 10 MG/ML; UG/ML
INJECTION, SOLUTION INFILTRATION; PERINEURAL AS NEEDED
Status: DISCONTINUED | OUTPATIENT
Start: 2024-06-28 | End: 2024-06-28 | Stop reason: HOSPADM

## 2024-06-28 RX ORDER — SODIUM CHLORIDE, SODIUM LACTATE, POTASSIUM CHLORIDE, CALCIUM CHLORIDE 600; 310; 30; 20 MG/100ML; MG/100ML; MG/100ML; MG/100ML
INJECTION, SOLUTION INTRAVENOUS CONTINUOUS PRN
Status: DISCONTINUED | OUTPATIENT
Start: 2024-06-28 | End: 2024-06-28

## 2024-06-28 RX ORDER — SUCCINYLCHOLINE/SOD CL,ISO/PF 100 MG/5ML
SYRINGE (ML) INTRAVENOUS AS NEEDED
Status: DISCONTINUED | OUTPATIENT
Start: 2024-06-28 | End: 2024-06-28

## 2024-06-28 RX ORDER — TRAMADOL HYDROCHLORIDE 50 MG/1
50 TABLET ORAL EVERY 6 HOURS PRN
Qty: 10 TABLET | Refills: 0 | Status: SHIPPED | OUTPATIENT
Start: 2024-06-28

## 2024-06-28 RX ORDER — FENTANYL CITRATE 50 UG/ML
INJECTION, SOLUTION INTRAMUSCULAR; INTRAVENOUS AS NEEDED
Status: DISCONTINUED | OUTPATIENT
Start: 2024-06-28 | End: 2024-06-28

## 2024-06-28 RX ORDER — DEXAMETHASONE SODIUM PHOSPHATE 10 MG/ML
INJECTION, SOLUTION INTRAMUSCULAR; INTRAVENOUS AS NEEDED
Status: DISCONTINUED | OUTPATIENT
Start: 2024-06-28 | End: 2024-06-28

## 2024-06-28 RX ORDER — PANTOPRAZOLE SODIUM 40 MG/10ML
40 INJECTION, POWDER, LYOPHILIZED, FOR SOLUTION INTRAVENOUS ONCE
Status: COMPLETED | OUTPATIENT
Start: 2024-06-28 | End: 2024-06-28

## 2024-06-28 RX ORDER — GINSENG 100 MG
CAPSULE ORAL AS NEEDED
Status: DISCONTINUED | OUTPATIENT
Start: 2024-06-28 | End: 2024-06-28 | Stop reason: HOSPADM

## 2024-06-28 RX ORDER — PROPOFOL 10 MG/ML
INJECTION, EMULSION INTRAVENOUS AS NEEDED
Status: DISCONTINUED | OUTPATIENT
Start: 2024-06-28 | End: 2024-06-28

## 2024-06-28 RX ORDER — MIDAZOLAM HYDROCHLORIDE 2 MG/2ML
INJECTION, SOLUTION INTRAMUSCULAR; INTRAVENOUS AS NEEDED
Status: DISCONTINUED | OUTPATIENT
Start: 2024-06-28 | End: 2024-06-28

## 2024-06-28 RX ORDER — HYDROMORPHONE HCL IN WATER/PF 6 MG/30 ML
0.2 PATIENT CONTROLLED ANALGESIA SYRINGE INTRAVENOUS
Status: DISCONTINUED | OUTPATIENT
Start: 2024-06-28 | End: 2024-06-28 | Stop reason: HOSPADM

## 2024-06-28 RX ORDER — CEFAZOLIN SODIUM 1 G/50ML
1000 SOLUTION INTRAVENOUS ONCE
Status: COMPLETED | OUTPATIENT
Start: 2024-06-28 | End: 2024-06-28

## 2024-06-28 RX ORDER — KETOROLAC TROMETHAMINE 30 MG/ML
INJECTION, SOLUTION INTRAMUSCULAR; INTRAVENOUS AS NEEDED
Status: DISCONTINUED | OUTPATIENT
Start: 2024-06-28 | End: 2024-06-28

## 2024-06-28 RX ORDER — ONDANSETRON 2 MG/ML
INJECTION INTRAMUSCULAR; INTRAVENOUS AS NEEDED
Status: DISCONTINUED | OUTPATIENT
Start: 2024-06-28 | End: 2024-06-28

## 2024-06-28 RX ORDER — ACETAMINOPHEN 325 MG/1
650 TABLET ORAL EVERY 4 HOURS PRN
Status: DISCONTINUED | OUTPATIENT
Start: 2024-06-28 | End: 2024-06-28 | Stop reason: HOSPADM

## 2024-06-28 RX ORDER — LIDOCAINE HYDROCHLORIDE 10 MG/ML
INJECTION, SOLUTION EPIDURAL; INFILTRATION; INTRACAUDAL; PERINEURAL AS NEEDED
Status: DISCONTINUED | OUTPATIENT
Start: 2024-06-28 | End: 2024-06-28 | Stop reason: HOSPADM

## 2024-06-28 RX ORDER — PROMETHAZINE HYDROCHLORIDE 25 MG/ML
25 INJECTION, SOLUTION INTRAMUSCULAR; INTRAVENOUS ONCE AS NEEDED
Status: DISCONTINUED | OUTPATIENT
Start: 2024-06-28 | End: 2024-06-28 | Stop reason: HOSPADM

## 2024-06-28 RX ADMIN — FENTANYL CITRATE 50 MCG: 50 INJECTION, SOLUTION INTRAMUSCULAR; INTRAVENOUS at 13:40

## 2024-06-28 RX ADMIN — CEFAZOLIN SODIUM 1000 MG: 1 SOLUTION INTRAVENOUS at 13:20

## 2024-06-28 RX ADMIN — MIDAZOLAM 2 MG: 1 INJECTION INTRAMUSCULAR; INTRAVENOUS at 13:11

## 2024-06-28 RX ADMIN — PROPOFOL 80 MG: 10 INJECTION, EMULSION INTRAVENOUS at 13:21

## 2024-06-28 RX ADMIN — KETOROLAC TROMETHAMINE 30 MG: 30 INJECTION, SOLUTION INTRAMUSCULAR; INTRAVENOUS at 13:51

## 2024-06-28 RX ADMIN — DEXAMETHASONE SODIUM PHOSPHATE 4 MG: 10 INJECTION, SOLUTION INTRAMUSCULAR; INTRAVENOUS at 13:21

## 2024-06-28 RX ADMIN — Medication 60 MG: at 13:15

## 2024-06-28 RX ADMIN — ONDANSETRON 4 MG: 2 INJECTION INTRAMUSCULAR; INTRAVENOUS at 13:21

## 2024-06-28 RX ADMIN — SODIUM CHLORIDE, SODIUM LACTATE, POTASSIUM CHLORIDE, AND CALCIUM CHLORIDE: .6; .31; .03; .02 INJECTION, SOLUTION INTRAVENOUS at 13:10

## 2024-06-28 RX ADMIN — PANTOPRAZOLE SODIUM 40 MG: 40 INJECTION, POWDER, FOR SOLUTION INTRAVENOUS at 14:21

## 2024-06-28 RX ADMIN — PROPOFOL 120 MG: 10 INJECTION, EMULSION INTRAVENOUS at 13:15

## 2024-06-28 RX ADMIN — FENTANYL CITRATE 50 MCG: 50 INJECTION, SOLUTION INTRAMUSCULAR; INTRAVENOUS at 13:14

## 2024-06-28 NOTE — DISCHARGE INSTR - AVS FIRST PAGE
Dr. Whitmore, DPM  Post-op surgery Instructions    Pain / Swelling  There is expected to be some discomfort, swelling and bruising of the foot. You might see some blood on the bandage. This is not a cause for alarm. However, if there is active or persistent bleeding (blood running out of the bandage while at rest) - call the office at once (or) go to a [facility] ER and ask them to page the podiatry residents.  Apply an ice bag to the top of your ankle for 30 minutes for each waking hour, for the first 72 hours. This should be discontinued when sleeping. This will also work through your cast if you have one. Ice must not leak and wet the dressings. Also, using the ice inappropriately can cause permanent nerve damage.  Your foot should be elevated as much as possible for the first 72 hours. The foot should be above heart level. If your foot is below heart level, throbbing and pain will increase.  When sleeping, elevation can be accomplished by putting a small hard suitcase between the box spring and mattress at the foot of the bed.  Walking and standing will increase pain, throbbing and bleeding.  Persistent pain despite elevation and your pain meds can many times be relieved by removing the tight brown compression layer (called the ACE wrap) that is over the white gauze dressing. If you are elevating and taking your pain meds and pain is still severe, remove this brown stretchy layer but leave the gauze intact. Wait 30 minutes. If the pain subsides, reapply the ACE so it’s not so tight. If pain doesn’t get better, call your doctor.   Dressings / Casts  Do not remove your surgical dressings - they will be changed at your doctor appointment. Do not allow surgical dressings to get wet. Sponge baths should be used until the sutures are removed.  Do not try to keep the foot dry using a garbage bag and tape - this rarely works.  If you get your dressings or cast wet - call your doctor immediately.  If your cast or  dressings feel tight - elevate your foot for 30 minutes. If this doesn’t help and you feel tingling or see toe discoloration - call your doctor or go to a [facility] ER and ask them to page the podiatry residents.   Do not put things in your cast such as powder, coat hangers to scratch, etc.. This can cause skin damage and infections.   Infection  If you have a fever at or above 100 degrees, chills, sweats, or see red streaks rising above the dressing or smell odor / see pus (creamy white drainage), call your doctor immediately or go to a [facility] ER and ask them to page the podiatry residents.  Constipation  If you have severe constipation after surgery, this can be due to the pain medication. Notify your doctor and special medication will be prescribed to deal with this.   Blood Clots  If you had surgery and are in a cast, have an external fixation device, or are non-weightbearing using crutches, a knee scooter, a wheelchair, a walker, or an iWalk device - you need to be on a blood thinner. Your doctor will prescribe one of the regimens below. If you run out of the blood thinner checked off below before you are walking normally on your foot and out of your cast - notify your doctor immediately so you can get a refill. Not doing so can lead to blood clots and serious complications including death.      Numbness  It is normal for your foot to be numb until about dinner time. If you’ve had a popliteal block procedure, you might be numb until the following day. When you start to feel pins and needles in the foot - this means the block is wearing off. That is the appropriate time to take your pain medication.   Pain Medication  Do not supplement your pain medication with over the counter drugs, old leftover pain medications, or extra Tylenol. You must discuss any additional medications with your doctor prior to taking them for pain.   Driving  No driving is allowed without discussion with the  doctor  Ambulation  Weight bear as tolerated to surgical foot  If given a flat, stiff shoe / darco wedge shoe, Do not walk at all without it.  Use a device (cane, walker, crutches) to take some weight off of the foot when walking  If instructed not to put weight on the surgical foot, use the following:  Surgical shoe  Putting weight on the foot will lead to complications.

## 2024-06-28 NOTE — OP NOTE
OPERATIVE REPORT - Podiatry  PATIENT NAME: Karen Napier    :  2000  MRN: [id number]  Pt Location:  OR ROOM 12    SURGERY DATE: 2024    Surgeons and Role:     * Richar Whitmore DPM - Primary     * Amol Youngblood DPM - Assisting    Pre-op Diagnosis:  Nail dystrophy [L60.3]    Post-Op Diagnosis Codes:     * Nail dystrophy [L60.3]    Procedure(s) (LRB):  REMOVAL TOENAIL / FINGERNAIL great toe and 2nd toe bilateral (Bilateral)    Specimen(s):  * No specimens in log *    Estimated Blood Loss:   Minimal    Drains:  * No LDAs found *    Anesthesia Type:   IV Sedation with Anesthesia with 10 ml of 1% Lidocaine and 1% Lidocaine w/ Epinephrine in a 1:1 mixture    Hemostasis:  - Atraumatic technique   - Manual compression   - Pneumatic ankle tourniquets     Materials:  * No implants in log *    Operative Findings:  - Consistent with diagnosis     Complications:   None    Procedure and Technique:     Under mild sedation, the patient was brought into the operating room and placed on the operating room table in the supine position. IV sedation was achieved by anesthesia team and a universal timeout was performed where all parties are in agreement of correct patient, correct procedure and correct site. A pneumatic tourniquet was then placed over the patient's bilateral lower extremity with ample padding. A 1st and 2nd digit block was performed bilaterally consisting of a total of 10 ml of 10 ml of 1% Lidocaine and 1% Lidocaine w/ Epinephrine in a 1:1 mixture. The foot was then prepped and draped in the usual aseptic manner. Gravity was used to exsangunate the foot and the pneumatic tourniquet was then inflated to 250 mmHg.    Attention was then directed to  the left foot, using a 15-blade the proximal nail was freed from soft tissue attachments at the 1st and 2nd digit. Using a mosquito forceps, the 1st and 2nd digit nail plates were removed in entirety. The nail bed was irrigated with NSS. Dressing  "applied consisting of Bacitracin, Adaptic, 2x2 and 4x4 gauze, Edmond, and secured with foam tape.     Attention was then directed to the right foot, using a 15-blade the proximal nail was freed from soft tissue attachments at the 1st and 2nd digit. Using a mosquito forceps, the 1st and 2nd digit nail plates were removed in entirety. The nail bed was irrigated with NSS. Dressing applied consisting of Bacitracin, Adaptic, 2x2 and 4x4 gauze, Edmond, and secured with foam tape.     The tourniquet was deflated at approximately 7 min on the left and 7 min on the right, and normal hyperemic response was noted to all digits. The patient tolerated the procedure and anesthesia well without immediate complications and transferred to PACU with vital signs stable.       Dr. Whitmore was present during the entire procedure and participated in all key aspects.    SIGNATURE: Amol Youngblood, PAULO  DATE: June 28, 2024  TIME: 1:51 PM      Portions of the record may have been created with voice recognition software. Occasional wrong word or \"sound a like\" substitutions may have occurred due to the inherent limitations of voice recognition software. Read the chart carefully and recognize, using context, where substitutions have occurred.        "
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
## 2024-06-28 NOTE — NURSING NOTE
Tolerated drinks. Denied need for pain medication. Dressings remain clean, dry and intact. No drainage. Toes warm to touch. Stockinettes place to feet along with surgical shoes.  Dr. Whitmore verbally went over care of feet with her significant other.

## 2024-06-28 NOTE — DISCHARGE SUMMARY
Discharge Summary Outpatient Procedure Podiatry -   Karen Napier 23 y.o. female MRN: [id number]  Unit/Bed#: OR POOL Encounter: [id number]    Admission Date: 6/28/2024     Admitting Diagnosis: Nail dystrophy [L60.3]    Discharge Diagnosis: same    Procedures Performed: REMOVAL TOENAIL / FINGERNAIL great toe and 2nd toe bilateral: 47387 (CPT®)    Complications: none    Condition at Discharge: stable    Discharge instructions/Information to patient and family:   See after visit summary for information provided to patient and family.      Provisions for Follow-Up Care/Important appointments:  See after visit summary for information related to follow-up care and any pertinent home health orders.      Discharge Medications:  See after visit summary for reconciled discharge medications provided to patient and family.

## 2024-06-28 NOTE — ANESTHESIA PREPROCEDURE EVALUATION
Procedure:  REMOVAL TOENAIL / FINGERNAIL great toe and 2nd toe bilateral (Bilateral: Toe)    Relevant Problems   GI/HEPATIC   (+) Chronic GERD      MUSCULOSKELETAL   (+) Strain of lumbar region      NEURO/PSYCH   (+) Anxiety, generalized        Physical Exam    Airway    Mallampati score: I  TM Distance: >3 FB  Neck ROM: full     Dental   No notable dental hx     Cardiovascular  Rhythm: regular, Rate: normal, Cardiovascular exam normal    Pulmonary  Pulmonary exam normal Breath sounds clear to auscultation    Other Findings  Explains to me severe Refulx symptoms. Has a severe hernia. Has reflux symptoms at rest. GI instructed her to only take puree foods because of the symptoms     Also discussed a more conscious sedation approach with local. Patient does not want to feel any needles and has severe anxiety     Opting to do general anesthesia post-pubertal.      Anesthesia Plan  ASA Score- 1     Anesthesia Type- general with ASA Monitors.         Additional Monitors:     Airway Plan: ETT.           Plan Factors-Exercise tolerance (METS): >4 METS.            Patient is not a current smoker.  Patient did not smoke on day of surgery.        Intended use of anticholinesterase.    Induction- rapid sequence induction.    Postoperative Plan- Plan for postoperative opioid use. Planned trial extubation    Perioperative Resuscitation Plan - Level 1 - Full Code.       Informed Consent- Anesthetic plan and risks discussed with patient.  I personally reviewed this patient with the CRNA. Discussed and agreed on the Anesthesia Plan with the CRNA..

## 2024-06-28 NOTE — ANESTHESIA POSTPROCEDURE EVALUATION
Post-Op Assessment Note    CV Status:  Stable  Pain Score: 0    Pain management: adequate       Mental Status:  Alert and awake   Hydration Status:  Euvolemic   PONV Controlled:  Controlled   Airway Patency:  Patent     Post Op Vitals Reviewed: Yes    No anethesia notable event occurred.    Staff: CRNA               /72 (06/28/24 1352)    Temp (!) 97.3 °F (36.3 °C) (06/28/24 1352)    Pulse 101 (06/28/24 1352)   Resp 16 (06/28/24 1352)    SpO2 99 % (06/28/24 1352)

## 2024-06-28 NOTE — TELEPHONE ENCOUNTER
Caller: Karen Napier    Doctor: Myranda Woods    Reason for call: Karen just had surgery approx 3 hours ago.  She feels she is bleeding excessively.  What should she do?  Can someone please reach out to her?  Thank you.     Call back#: [phone]

## 2024-07-09 ENCOUNTER — OFFICE VISIT (OUTPATIENT)
Dept: PODIATRY | Facility: CLINIC | Age: 24
End: 2024-07-09
Payer: MEDICARE

## 2024-07-09 VITALS
RESPIRATION RATE: 18 BRPM | BODY MASS INDEX: 18.71 KG/M2 | SYSTOLIC BLOOD PRESSURE: 120 MMHG | HEART RATE: 92 BPM | DIASTOLIC BLOOD PRESSURE: 73 MMHG | HEIGHT: 64 IN

## 2024-07-09 DIAGNOSIS — L60.3 NAIL DYSTROPHY: Primary | ICD-10-CM

## 2024-07-09 PROCEDURE — 99212 OFFICE O/P EST SF 10 MIN: CPT | Performed by: PODIATRIST

## 2024-07-10 NOTE — PROGRESS NOTES
Patient presents approximately 11 days post total nail avulsion hallux bilateral and second toes bilateral.  Surgical sites healing uneventfully.  The second toenail avulsion appear virtually healed while the left still has dried blood and eschar.    Patient may discontinue soaks and Neosporin.  She may let toes air dry without Band-Aids.  She will be rescheduled in 3 months to assess nails as they return.

## 2024-10-03 ENCOUNTER — TELEPHONE (OUTPATIENT)
Age: 24
End: 2024-10-03

## 2024-10-21 ENCOUNTER — TELEPHONE (OUTPATIENT)
Dept: PSYCHIATRY | Facility: CLINIC | Age: 24
End: 2024-10-21

## 2024-10-21 NOTE — TELEPHONE ENCOUNTER
Forms sent via [employer].    Spoke with patient. She is aware that the forms should be completed via [employer] prior to appt.

## 2024-11-01 ENCOUNTER — TELEPHONE (OUTPATIENT)
Age: 24
End: 2024-11-01

## 2024-11-01 NOTE — TELEPHONE ENCOUNTER
Contacted pt. [facility] Wait list, PT was scheduled for 2/4 at 1200 with Dr. Manuel, no intake due to pt being established.

## 2025-01-19 ENCOUNTER — TELEPHONE (OUTPATIENT)
Dept: BEHAVIORAL/MENTAL HEALTH CLINIC | Facility: CLINIC | Age: 25
End: 2025-01-19

## 2025-01-19 NOTE — TELEPHONE ENCOUNTER
NO-SHOW LETTER MAILED TO Karen Napier.  ADDRESS: [address] [place] [address]  For appt 1/14/2025

## 2025-02-16 ENCOUNTER — TELEPHONE (OUTPATIENT)
Dept: PSYCHIATRY | Facility: CLINIC | Age: 25
End: 2025-02-16

## 2025-02-17 NOTE — TELEPHONE ENCOUNTER
NO-SHOW LETTER MAILED TO Karen Napier.  ADDRESS: [address] [place] [address]    For appt 2/4/2025

## 2025-04-16 ENCOUNTER — OFFICE VISIT (OUTPATIENT)
Dept: URGENT CARE | Age: 25
End: 2025-04-16
Payer: MEDICARE

## 2025-04-16 VITALS
TEMPERATURE: 97.5 F | DIASTOLIC BLOOD PRESSURE: 74 MMHG | OXYGEN SATURATION: 100 % | HEART RATE: 106 BPM | RESPIRATION RATE: 20 BRPM | SYSTOLIC BLOOD PRESSURE: 122 MMHG

## 2025-04-16 DIAGNOSIS — K04.7 DENTAL ABSCESS: Primary | ICD-10-CM

## 2025-04-16 PROCEDURE — 99213 OFFICE O/P EST LOW 20 MIN: CPT

## 2025-04-16 RX ORDER — CLINDAMYCIN HYDROCHLORIDE 300 MG/1
300 CAPSULE ORAL 4 TIMES DAILY
Qty: 28 CAPSULE | Refills: 0 | Status: SHIPPED | OUTPATIENT
Start: 2025-04-16 | End: 2025-04-23

## 2025-04-17 ENCOUNTER — TELEPHONE (OUTPATIENT)
Dept: URGENT CARE | Age: 25
End: 2025-04-17

## 2025-04-17 DIAGNOSIS — K04.7 DENTAL ABSCESS: Primary | ICD-10-CM

## 2025-04-17 RX ORDER — CLINDAMYCIN PALMITATE HYDROCHLORIDE 75 MG/5ML
300 SOLUTION ORAL 3 TIMES DAILY
Qty: 420 ML | Refills: 0 | Status: SHIPPED | OUTPATIENT
Start: 2025-04-17 | End: 2025-04-24

## 2025-04-17 NOTE — PROGRESS NOTES
[facility]        NAME: Karen Napier is a 24 y.o. female  : 2000    MRN: [id number]  DATE: 2025  TIME: 8:33 PM    Assessment and Plan   Dental abscess [K04.7]  1. Dental abscess  clindamycin (CLEOCIN) 300 MG capsule        Monitor stool for diarrhea and/or blood. If this occurs, contact primary care doctor ASAP.    Recommend saltwater rinses every 2 hours, soft toothbrush, and soft food.  Tylenol 1000 mg q8h as needed for pain  Ibuprofen 800 mg every 6 hours as needed for pain  If you develop any increased pain, facial swelling, or unable to open or close your mouth, prolonged high fever, any new or concerning symptoms please return proceed ER.  Advised follow-up with dentist as soon as possible.     Patient Instructions       Follow up with PCP in 3-5 days.  Proceed to  ER if symptoms worsen.    If tests have been performed at [facility], our office will contact you with results if changes need to be made to the care plan discussed with you at the visit.  [facility]    Chief Complaint     Chief Complaint   Patient presents with   • Abscess     Abscess top gums since this morning.         History of Present Illness       Pt is a 24 year old female presenting with 2 days of pain and swelling to front upper teeth; concern for dental abscess.  Has history of multiple dental abscesses, as she has multiple dental caries, broken teeth.  She reports saving for dental implants.  She reports most recent dental abscess was approximately 5 months ago which she took clindamycin for with relief.  At this time she denies fever, chills, difficulty swallowing, changes in her voice, or pain.  She reports taking Tylenol and ibuprofen for pain, and using salt water gargles and rinses without relief.    Abscess  Pertinent negatives include no chills, coughing, fever, nausea, sore throat or vomiting.       Review of Systems   Review of Systems   Constitutional:   Negative for chills and fever.   HENT:  Positive for dental problem. Negative for sore throat and trouble swallowing.    Respiratory:  Negative for cough.    Gastrointestinal:  Negative for nausea and vomiting.         Current Medications       Current Outpatient Medications:   •  Acetaminophen (TYLENOL DISSOLVE PACKS PO), Take by mouth, Disp: , Rfl:   •  clindamycin (CLEOCIN) 300 MG capsule, Take 1 capsule (300 mg total) by mouth 4 (four) times a day for 7 days, Disp: 28 capsule, Rfl: 0  •  diphenhydrAMINE (BENADRYL) 25 mg capsule, Take 25 mg by mouth every 6 (six) hours as needed for itching, Disp: , Rfl:   •  mirtazapine (REMERON SOL-TAB) 15 mg disintegrating tablet, Take 1 tablet (15 mg total) by mouth daily at bedtime, Disp: 30 tablet, Rfl: 0  •  omeprazole (PriLOSEC) 20 mg delayed release capsule, Take 1 capsule (20 mg total) by mouth 2 (two) times a day before meals, Disp: 180 capsule, Rfl: 0  •  sucralfate (CARAFATE) 1 g/10 mL suspension, Take 10 mL (1 g total) by mouth 4 (four) times a day (with meals and at bedtime), Disp: 3600 mL, Rfl: 0  •  traMADol (Ultram) 50 mg tablet, Take 1 tablet (50 mg total) by mouth every 6 (six) hours as needed for moderate pain for up to 10 doses (Patient not taking: Reported on 4/16/2025), Disp: 10 tablet, Rfl: 0    Current Allergies     Allergies as of 04/16/2025 - Reviewed 04/16/2025   Allergen Reaction Noted   • Amoxicillin GI Intolerance and Vomiting 01/30/2022            The following portions of the patient's history were reviewed and updated as appropriate: allergies, current medications, past family history, past medical history, past social history, past surgical history and problem list.     Past Medical History:   Diagnosis Date   • Anxiety    • Dental cavities    • Depression    • Dysphagia    • GERD (gastroesophageal reflux disease)    • Hiatal hernia with GERD     Sliding hiatial hernia   • Migraine    • Urinary tract infection     at age 8 or 10       Past  Surgical History:   Procedure Laterality Date   • EGD     • RI AVULSION NAIL PLATE PARTIAL/COMPLETE SIMPLE 1 Bilateral 6/28/2024    Procedure: REMOVAL TOENAIL / FINGERNAIL great toe and 2nd toe bilateral;  Surgeon: Richar Whitmore DPM;  Location:  MAIN OR;  Service: Podiatry       Family History   Problem Relation Age of Onset   • Cancer Mother    • No Known Problems Father    • Heart disease Maternal Grandmother    • Diabetes Maternal Grandmother    • Cancer Maternal Grandfather          Medications have been verified.        Objective   /74   Pulse (!) 106   Temp 97.5 °F (36.4 °C) (Tympanic)   Resp 20   LMP 04/02/2025 (Approximate)   SpO2 100%   Patient's last menstrual period was 04/02/2025 (approximate).       Physical Exam     Physical Exam  Vitals and nursing note reviewed.   Constitutional:       General: She is not in acute distress.     Appearance: Normal appearance. She is normal weight. She is not ill-appearing.   HENT:      Right Ear: Tympanic membrane normal.      Left Ear: Tympanic membrane normal.      Mouth/Throat:      Mouth: Mucous membranes are moist.      Dentition: Abnormal dentition. Dental tenderness, gingival swelling, dental caries and dental abscesses present.     Cardiovascular:      Rate and Rhythm: Normal rate.      Pulses: Normal pulses.   Pulmonary:      Effort: Pulmonary effort is normal.      Breath sounds: Normal breath sounds.   Neurological:      Mental Status: She is alert.

## 2025-04-17 NOTE — PATIENT INSTRUCTIONS
Take antibiotic as directed.  Take entire course of antibiotics.     Eat yogurt with live and active cultures and/or take a probiotic at least 3 hours before or after antibiotic dose.   Monitor stool for diarrhea and/or blood. If this occurs, contact primary care doctor ASAP.      Recommend saltwater rinses every 2 hours, soft toothbrush, and soft food.    Tylenol 1000 mg q8h as needed for pain  Ibuprofen 800 mg every 6 hours as needed for pain    If you develop any increased pain, facial swelling, or unable to open or close your mouth, prolonged high fever, any new or concerning symptoms please return proceed ER.    Advised follow-up with dentist as soon as possible.

## 2025-04-24 DIAGNOSIS — K21.9 CHRONIC GERD: ICD-10-CM

## 2025-04-24 RX ORDER — OMEPRAZOLE 20 MG/1
CAPSULE, DELAYED RELEASE ORAL
Qty: 60 CAPSULE | Refills: 1 | Status: SHIPPED | OUTPATIENT
Start: 2025-04-24

## 2025-04-24 NOTE — TELEPHONE ENCOUNTER
Left message for patient to call and schedule follow up office visit to medication in order to continue receiving refills.

## 2025-04-24 NOTE — TELEPHONE ENCOUNTER
Patients GI provider:  Dr. Good    Number to return call: [phone]    Reason for call: The patient called in to schedule an annual visit to continue receiving medication; however, nothing was available before 10/2025. We put the patient on a waitlist. The patient was concerned about this because she does not want to have issues with refilling her medications.      Scheduled procedure/appointment date if applicable: Appt: 10/01/2025

## 2025-04-24 NOTE — TELEPHONE ENCOUNTER
Please call patient for non urgent follow up for med refill due to not being seen within 1 year. I will send short refill for now, thank you!

## 2025-05-27 ENCOUNTER — HOSPITAL ENCOUNTER (EMERGENCY)
Facility: HOSPITAL | Age: 25
Discharge: HOME | End: 2025-05-27
Attending: EMERGENCY MEDICINE
Payer: MEDICARE

## 2025-05-27 VITALS
SYSTOLIC BLOOD PRESSURE: 102 MMHG | RESPIRATION RATE: 17 BRPM | HEART RATE: 83 BPM | HEIGHT: 65 IN | TEMPERATURE: 98.6 F | DIASTOLIC BLOOD PRESSURE: 59 MMHG | OXYGEN SATURATION: 98 % | WEIGHT: 120 LBS | BODY MASS INDEX: 19.99 KG/M2

## 2025-05-27 DIAGNOSIS — G43.909 MIGRAINE HEADACHE: Primary | ICD-10-CM

## 2025-05-27 PROCEDURE — 96375 TX/PRO/DX INJ NEW DRUG ADDON: CPT

## 2025-05-27 PROCEDURE — 99282 EMERGENCY DEPT VISIT SF MDM: CPT

## 2025-05-27 PROCEDURE — 96365 THER/PROPH/DIAG IV INF INIT: CPT

## 2025-05-27 PROCEDURE — 99284 EMERGENCY DEPT VISIT MOD MDM: CPT | Performed by: EMERGENCY MEDICINE

## 2025-05-27 RX ORDER — METOCLOPRAMIDE HYDROCHLORIDE 5 MG/ML
10 INJECTION INTRAMUSCULAR; INTRAVENOUS ONCE
Status: COMPLETED | OUTPATIENT
Start: 2025-05-27 | End: 2025-05-27

## 2025-05-27 RX ORDER — MAGNESIUM SULFATE HEPTAHYDRATE 40 MG/ML
2 INJECTION, SOLUTION INTRAVENOUS ONCE
Status: COMPLETED | OUTPATIENT
Start: 2025-05-27 | End: 2025-05-27

## 2025-05-27 RX ORDER — KETOROLAC TROMETHAMINE 30 MG/ML
15 INJECTION, SOLUTION INTRAMUSCULAR; INTRAVENOUS ONCE
Status: COMPLETED | OUTPATIENT
Start: 2025-05-27 | End: 2025-05-27

## 2025-05-27 RX ORDER — ACETAMINOPHEN 325 MG/1
650 TABLET ORAL ONCE
Status: DISCONTINUED | OUTPATIENT
Start: 2025-05-27 | End: 2025-05-27 | Stop reason: HOSPADM

## 2025-05-27 RX ORDER — DEXAMETHASONE SODIUM PHOSPHATE 10 MG/ML
10 INJECTION, SOLUTION INTRAMUSCULAR; INTRAVENOUS ONCE
Status: COMPLETED | OUTPATIENT
Start: 2025-05-27 | End: 2025-05-27

## 2025-05-27 RX ADMIN — METOCLOPRAMIDE 10 MG: 5 INJECTION, SOLUTION INTRAMUSCULAR; INTRAVENOUS at 15:23

## 2025-05-27 RX ADMIN — KETOROLAC TROMETHAMINE 15 MG: 30 INJECTION, SOLUTION INTRAMUSCULAR; INTRAVENOUS at 15:22

## 2025-05-27 RX ADMIN — SODIUM CHLORIDE 1000 ML: 0.9 INJECTION, SOLUTION INTRAVENOUS at 15:23

## 2025-05-27 RX ADMIN — DEXAMETHASONE SODIUM PHOSPHATE 10 MG: 10 INJECTION, SOLUTION INTRAMUSCULAR; INTRAVENOUS at 15:23

## 2025-05-27 RX ADMIN — MAGNESIUM SULFATE HEPTAHYDRATE 2 G: 40 INJECTION, SOLUTION INTRAVENOUS at 15:23

## 2025-05-27 NOTE — DISCHARGE INSTRUCTIONS
You were seen today for a migraine headache. Please follow up with your primary care doctor about migraines should they persist as there are some medications that may be helpful to you. Return to the ER for worsening or repeated headache with any numbness/tingling/weakness, fevers, vision changes, or vomiting

## 2025-05-27 NOTE — ED ATTENDING ATTESTATION
5/27/2025  ISree DO, saw and evaluated the patient. I have discussed the patient with the resident/non-physician practitioner and agree with the resident's/non-physician practitioner's findings, Plan of Care, and MDM as documented in the resident's/non-physician practitioner's note, except where noted. All available labs and Radiology studies were reviewed.  I was present for key portions of any procedure(s) performed by the resident/non-physician practitioner and I was immediately available to provide assistance.       At this point I agree with the current assessment done in the Emergency Department.  I have conducted an independent evaluation of this patient a history and physical is as follows:    24-year-old female presents for evaluation of typical HA a/w nausea, photophobia and malaise for the past day.  Headache was gradual in onset, diffuse, throbbing and intermittently severe.  It is more global than usual.  No recent trauma or over exertion. No h/o essential hypertension. No known end-organ damage.  She has not previously seen neurology.    ROS: Denies f/c, neck or back pain, CP, SOB, abdominal pain, v/d. 12 system ROS o/w negative.    PE: Mild distress, appears uncomfortable, alert; MMM, no posterior oropharyngeal exudate, edema or erythema; no nuchal rigidity; HRR; lungs CTA, POx 100% on RA (nl); abdomen s/nt/nd, nl BS; (-) LE edema; skin p/w/d; CN II-XII GI/NF, oriented, FROM extremities x4 with intact strength and sensation.    MDM/DDx: HA - migraine, tension headache, cluster headache, hormonal changes, less likely but at risk for ICH, edema.    A/P: Will treat symptoms with migraine cocktail, reevaluate for further w/u or disposition.    ED Course         Critical Care Time  Procedures

## 2025-05-27 NOTE — ED PROVIDER NOTES
Time reflects when diagnosis was documented in both MDM as applicable and the Disposition within this note       Time User Action Codes Description Comment    5/27/2025  4:26 PM Esther Cristobal Add [G43.909] Migraine headache           ED Disposition       ED Disposition   Discharge    Condition   Stable    Date/Time   Tue May 27, 2025  4:26 PM    Comment   Karen Napier discharge to home/self care.                   Assessment & Plan       Medical Decision Making  Risk  OTC drugs.  Prescription drug management.      Patient is a 24 y.o. female  PMH migraine headaches, anxiety, depression, GERD who presents to the ED with chief complaint persistent headache.  Says that she had left trapezius cramping last night.  This morning, she woke up with a throbbing headache radiating from the occiput to bilateral frontal.  Is associate with photophobia.  Denies any vision changes, double vision, blurry vision.  She does have pressure behind bilateral eyes.  She says the pain in this migraine is throbbing and similar to her typical migraine but is more generalized than typical. Typically in the migraine it is more one-sided than global.  Headache is not positional in nature - improved with laying down and resting. She denies any numbness, weakness, tingling.  No nausea or vomiting.  Took Tylenol and drink caffeine this morning without relief.  Typically that will break her migraine.    Vital signs stable, afebrile. Exam as listed below.    Differential diagnosis includes but is not limited to migraine headache versus tension headache.  Exam not consistent with occipital neuralgia.    Plan migraine cocktail with IV fluids, magnesium, Reglan, Decadron, Tylenol, Toradol.    View ED course above for further discussion on patient workup.     All labs reviewed and utilized in the medical decision making process  All radiology studies independently viewed by me and interpreted by the radiologist.  I reviewed all testing with the  patient.     Upon re-evaluation patient's headache now 1-2 out of 10.  Almost complete resolution of his symptoms.  Stable for discharge at this time.  Discussed following up with her primary care doctor for possible sumatriptan or rizatriptan prescription if migraines are frequent. Return precautions discussed.     ED Course as of 05/27/25 2004   Tue May 27, 2025   1626 Headache now 2/10. Will provide discharge papers        Medications   sodium chloride 0.9 % bolus 1,000 mL (0 mL Intravenous Stopped 5/27/25 1637)   metoclopramide (REGLAN) injection 10 mg (10 mg Intravenous Given 5/27/25 1523)   dexamethasone (PF) (DECADRON) injection 10 mg (10 mg Intravenous Given 5/27/25 1523)   ketorolac (TORADOL) injection 15 mg (15 mg Intravenous Given 5/27/25 1522)   magnesium sulfate 2 g/50 mL IVPB (premix) 2 g (0 g Intravenous Stopped 5/27/25 1637)       ED Risk Strat Scores                    No data recorded        SBIRT 22yo+      Flowsheet Row Most Recent Value   Initial Alcohol Screen: US AUDIT-C     1. How often do you have a drink containing alcohol? 2 Filed at: 05/27/2025 1408   2. How many drinks containing alcohol do you have on a typical day you are drinking?  1 Filed at: 05/27/2025 1408   3a. Male UNDER 65: How often do you have five or more drinks on one occasion? 0 Filed at: 05/27/2025 1408   3b. FEMALE Any Age, or MALE 65+: How often do you have 4 or more drinks on one occassion? 0 Filed at: 05/27/2025 1408   Audit-C Score 3 Filed at: 05/27/2025 1408   YA: How many times in the past year have you...    Used an illegal drug or used a prescription medication for non-medical reasons? Never Filed at: 05/27/2025 1408                            History of Present Illness       Chief Complaint   Patient presents with    Migraine     Pt c/o migraine headache that started this am. Pt is sensative to light. No n/v       Past Medical History[1]   Past Surgical History[2]   Family History[3]   Social History[4]    E-Cigarette/Vaping    E-Cigarette Use Current Every Day User       E-Cigarette/Vaping Substances    Nicotine Yes     THC No     CBD No     Flavoring No     Other No     Unknown No       I have reviewed and agree with the history as documented.     24 y.o. female CC 1 day migraine-like headache         Review of Systems   Constitutional:  Negative for appetite change, chills and fever.   HENT:  Negative for congestion and rhinorrhea.    Eyes:  Positive for photophobia. Negative for visual disturbance.   Respiratory:  Negative for cough and shortness of breath.    Gastrointestinal:  Negative for abdominal distention, nausea and vomiting.   Skin:  Negative for color change and rash.   Neurological:  Positive for headaches. Negative for dizziness, seizures, syncope, weakness, light-headedness and numbness.   Psychiatric/Behavioral:  Negative for confusion.            Objective       ED Triage Vitals [05/27/25 1407]   Temperature Pulse Blood Pressure Respirations SpO2 Patient Position - Orthostatic VS   98.6 °F (37 °C) 84 134/75 18 100 % Sitting      Temp Source Heart Rate Source BP Location FiO2 (%) Pain Score    Temporal -- Left arm -- 8      Vitals      Date and Time Temp Pulse SpO2 Resp BP Pain Score FACES Pain Rating User   05/27/25 1608 -- 83 98 % 17 102/59 -- -- MKH   05/27/25 1407 98.6 °F (37 °C) 84 100 % 18 134/75 8 -- BG            Physical Exam  Constitutional:       General: She is not in acute distress.     Appearance: Normal appearance. She is not ill-appearing or diaphoretic.      Comments: Patient in degroot bed with eyes covered by blanket    HENT:      Head: Normocephalic and atraumatic.      Nose: Nose normal.      Mouth/Throat:      Mouth: Mucous membranes are moist.     Eyes:      Pupils: Pupils are equal, round, and reactive to light.     Neck:      Comments: No palpable trap muscle spasm  No exacerbation of symptoms with palpation along neck or occiput   Cardiovascular:      Rate and Rhythm: Normal  rate and regular rhythm.      Pulses: Normal pulses.   Pulmonary:      Effort: Pulmonary effort is normal.   Abdominal:      General: Abdomen is flat.     Musculoskeletal:      Cervical back: Normal range of motion and neck supple. No rigidity or tenderness.     Skin:     General: Skin is warm and dry.     Neurological:      General: No focal deficit present.      Mental Status: She is alert and oriented to person, place, and time.      Cranial Nerves: No cranial nerve deficit.      Sensory: No sensory deficit.      Motor: No weakness.         Results Reviewed       None            No orders to display       Procedures    ED Medication and Procedure Management   Prior to Admission Medications   Prescriptions Last Dose Informant Patient Reported? Taking?   Acetaminophen (TYLENOL DISSOLVE PACKS PO)  Self Yes No   Sig: Take by mouth   diphenhydrAMINE (BENADRYL) 25 mg capsule  Self Yes No   Sig: Take 25 mg by mouth every 6 (six) hours as needed for itching   mirtazapine (REMERON SOL-TAB) 15 mg disintegrating tablet  Self No No   Sig: Take 1 tablet (15 mg total) by mouth daily at bedtime   omeprazole (PriLOSEC) 20 mg delayed release capsule   No No   Sig: TAKE 1 CAPSULE BY MOUTH 2 TIMES A DAY BEFORE MEALS.   sucralfate (CARAFATE) 1 g/10 mL suspension   No No   Sig: Take 10 mL (1 g total) by mouth 4 (four) times a day (with meals and at bedtime)   traMADol (Ultram) 50 mg tablet   No No   Sig: Take 1 tablet (50 mg total) by mouth every 6 (six) hours as needed for moderate pain for up to 10 doses   Patient not taking: Reported on 4/16/2025      Facility-Administered Medications: None     Discharge Medication List as of 5/27/2025  4:28 PM        CONTINUE these medications which have NOT CHANGED    Details   Acetaminophen (TYLENOL DISSOLVE PACKS PO) Take by mouth, Historical Med      diphenhydrAMINE (BENADRYL) 25 mg capsule Take 25 mg by mouth every 6 (six) hours as needed for itching, Historical Med      mirtazapine (REMERON  SOL-TAB) 15 mg disintegrating tablet Take 1 tablet (15 mg total) by mouth daily at bedtime, Starting Thu 5/9/2024, Until Tue 6/25/2024, Normal      omeprazole (PriLOSEC) 20 mg delayed release capsule TAKE 1 CAPSULE BY MOUTH 2 TIMES A DAY BEFORE MEALS., Normal      sucralfate (CARAFATE) 1 g/10 mL suspension Take 10 mL (1 g total) by mouth 4 (four) times a day (with meals and at bedtime), Starting Tue 6/11/2024, Until Mon 9/9/2024, Normal      traMADol (Ultram) 50 mg tablet Take 1 tablet (50 mg total) by mouth every 6 (six) hours as needed for moderate pain for up to 10 doses, Starting Fri 6/28/2024, Normal           No discharge procedures on file.  ED SEPSIS DOCUMENTATION   Time reflects when diagnosis was documented in both MDM as applicable and the Disposition within this note       Time User Action Codes Description Comment    5/27/2025  4:26 PM Esther Cristobal Add [G43.909] Migraine headache                    [1]   Past Medical History:  Diagnosis Date    Anxiety     Dental cavities     Depression     Dysphagia     GERD (gastroesophageal reflux disease)     Hiatal hernia with GERD     Sliding hiatial hernia    Migraine     Urinary tract infection     at age 8 or 10   [2]   Past Surgical History:  Procedure Laterality Date    EGD      AK AVULSION NAIL PLATE PARTIAL/COMPLETE SIMPLE 1 Bilateral 6/28/2024    Procedure: REMOVAL TOENAIL / FINGERNAIL great toe and 2nd toe bilateral;  Surgeon: Richar Whitmore DPM;  Location:  MAIN OR;  Service: Podiatry   [3]   Family History  Problem Relation Name Age of Onset    Cancer Mother Amanda     No Known Problems Father Muary     Heart disease Maternal Grandmother      Diabetes Maternal Grandmother      Cancer Maternal Grandfather     [4]   Social History  Tobacco Use    Smoking status: Never    Smokeless tobacco: Never   Vaping Use    Vaping status: Every Day    Substances: Nicotine   Substance Use Topics    Alcohol use: Yes     Comment: rarely    Drug use: No        Esther  MD Susu  05/27/25 2004

## 2025-05-28 ENCOUNTER — VBI (OUTPATIENT)
Age: 25
End: 2025-05-28

## 2025-05-28 DIAGNOSIS — K21.9 CHRONIC GERD: ICD-10-CM

## 2025-05-28 RX ORDER — OMEPRAZOLE 20 MG/1
CAPSULE, DELAYED RELEASE ORAL
Qty: 60 CAPSULE | Refills: 1 | Status: SHIPPED | OUTPATIENT
Start: 2025-05-28

## 2025-05-28 NOTE — TELEPHONE ENCOUNTER
05/28/25 11:20 AM    Patient contacted post ED visit, first outreach attempt made. Message was left for patient to return a call to the [facility] Department at [place]: Phone [phone].    Thank you.  [place] Mead MA  [facility]

## 2025-05-28 NOTE — LETTER
[facility]  [address]  [place] [address]  [phone]    Date: 05/29/25    Karen Napier  [address]  [place] [address]    Dear Karen:                                                                                                                                Thank you for choosing [facility] emergency department for care.  Your primary care provider wants to make sure that your ongoing medical care is being addressed. If you require follow up care as a result of your emergency department visit, there are a few things the practice would like you to know.                As part of the network's continuing commitment to caring for our patients, we have added more same day appointments and have extended office hours to meet your medical needs. After hours, on-call physicians are available via your primary care provider's main office line.               We encourage you to contact our office prior to seeking treatment to discuss your symptoms with the medical staff.  Together, we can determine the correct course of action.  A majority of non-emergent conditions such as: common cold, flu-like symptoms, fevers, strains/sprains, dislocations, minor burns, cuts and animal bites can be treated at [facility] facilities. Diagnostic testing is available at some sites.               Of course, if you are experiencing a life threatening medical emergency call 911 or proceed directly to the nearest emergency room.    Your nearest [facility] facility is conveniently located at:    [address] [address]  [phone]  SKIP THE WAIT  Conveniently offered at most [facility] locations  [place] your spot online at [email or web address] or on the [facility]    Sincerely,    [facility]  Dept: [phone]

## 2025-05-29 NOTE — TELEPHONE ENCOUNTER
05/29/25 9:14 AM    Patient contacted post ED visit, second outreach attempt made. Message was left for patient to return a call to the [facility] Department at [place]: Phone [phone].    Thank you.  [place] Mead MA  [facility]

## 2025-05-29 NOTE — TELEPHONE ENCOUNTER
05/29/25 3:45 PM    Patient contacted post ED visit, phone outreaches were unsuccessful and a MyChart letter has been sent to the patient as follow-up.    Thank you.  [place] Mead MA  [facility]

## (undated) DEVICE — NEEDLE 25G X 1 1/2

## (undated) DEVICE — CUFF TOURNIQUET 18 X 4 IN QUICK CONNECT DISP 1 BLADDER

## (undated) DEVICE — STRETCH BANDAGE: Brand: CURITY

## (undated) DEVICE — SPECIMEN CONTAINER STERILE PEEL PACK

## (undated) DEVICE — NEEDLE BLUNT 18 G X 1 1/2IN

## (undated) DEVICE — DISPOSABLE OR TOWEL: Brand: CARDINAL HEALTH

## (undated) DEVICE — INTENDED FOR TISSUE SEPARATION, AND OTHER PROCEDURES THAT REQUIRE A SHARP SURGICAL BLADE TO PUNCTURE OR CUT.: Brand: BARD-PARKER ® SAFETYLOCK CARBON RIB-BACK BLADES

## (undated) DEVICE — GAUZE SPONGES,8 PLY: Brand: CURITY

## (undated) DEVICE — POV-IOD SOLUTION 4OZ BT

## (undated) DEVICE — CURITY NON-ADHERENT STRIPS: Brand: CURITY

## (undated) DEVICE — SYRINGE 3ML LL

## (undated) DEVICE — MAYO STAND COVER: Brand: CONVERTORS

## (undated) DEVICE — GAUZE SPONGES,16 PLY: Brand: CURITY

## (undated) DEVICE — STERILE POLYISOPRENE POWDER-FREE SURGICAL GLOVES: Brand: PROTEXIS